# Patient Record
Sex: FEMALE | Race: WHITE | NOT HISPANIC OR LATINO | Employment: OTHER | URBAN - NONMETROPOLITAN AREA
[De-identification: names, ages, dates, MRNs, and addresses within clinical notes are randomized per-mention and may not be internally consistent; named-entity substitution may affect disease eponyms.]

---

## 2023-04-06 PROBLEM — L57.0 ACTINIC KERATOSES: Status: ACTIVE | Noted: 2023-04-06

## 2023-04-06 PROBLEM — L98.9 SKIN LESION: Status: ACTIVE | Noted: 2023-04-06

## 2023-04-06 PROBLEM — M79.675 PAIN OF TOE OF LEFT FOOT: Status: ACTIVE | Noted: 2023-04-06

## 2023-04-06 PROBLEM — R73.9 ELEVATED BLOOD SUGAR: Status: ACTIVE | Noted: 2023-04-06

## 2023-04-06 PROBLEM — M79.603 ARM PAIN: Status: ACTIVE | Noted: 2023-04-06

## 2023-04-06 PROBLEM — I10 BENIGN HYPERTENSION: Status: ACTIVE | Noted: 2023-04-06

## 2023-04-06 PROBLEM — L91.8 SKIN TAG: Status: ACTIVE | Noted: 2023-04-06

## 2023-04-06 PROBLEM — E03.9 ADULT HYPOTHYROIDISM: Status: ACTIVE | Noted: 2023-04-06

## 2023-04-06 PROBLEM — S69.92XA WRIST INJURY, LEFT, INITIAL ENCOUNTER: Status: ACTIVE | Noted: 2023-04-06

## 2023-04-06 PROBLEM — F41.9 ANXIETY: Status: ACTIVE | Noted: 2023-04-06

## 2023-04-06 PROBLEM — R71.8 ELEVATED HEMATOCRIT: Status: ACTIVE | Noted: 2023-04-06

## 2023-04-06 PROBLEM — N18.30 CHRONIC KIDNEY DISEASE, STAGE 3 (MULTI): Status: ACTIVE | Noted: 2023-04-06

## 2023-04-06 PROBLEM — F51.01 PRIMARY INSOMNIA: Status: ACTIVE | Noted: 2023-04-06

## 2023-04-06 PROBLEM — M79.642 PAIN OF LEFT HAND: Status: ACTIVE | Noted: 2023-04-06

## 2023-04-06 PROBLEM — R29.898 LEFT HAND WEAKNESS: Status: ACTIVE | Noted: 2023-04-06

## 2023-04-06 PROBLEM — K21.9 GERD (GASTROESOPHAGEAL REFLUX DISEASE): Status: ACTIVE | Noted: 2023-04-06

## 2023-04-06 PROBLEM — M25.642 STIFFNESS OF FINGER JOINT OF LEFT HAND: Status: ACTIVE | Noted: 2023-04-06

## 2023-04-06 PROBLEM — S62.102A CLOSED FRACTURE OF LEFT WRIST: Status: ACTIVE | Noted: 2023-04-06

## 2023-04-06 PROBLEM — I49.9 CARDIAC ARRHYTHMIA: Status: ACTIVE | Noted: 2023-04-06

## 2023-04-06 PROBLEM — S62.102D: Status: ACTIVE | Noted: 2023-04-06

## 2023-04-06 RX ORDER — AMLODIPINE BESYLATE 10 MG/1
1 TABLET ORAL DAILY
COMMUNITY
Start: 2021-02-17 | End: 2023-05-27 | Stop reason: SDUPTHER

## 2023-04-06 RX ORDER — LOSARTAN POTASSIUM 50 MG/1
1 TABLET ORAL DAILY
COMMUNITY
Start: 2021-02-17 | End: 2023-04-07 | Stop reason: SDUPTHER

## 2023-04-06 RX ORDER — MIRTAZAPINE 7.5 MG/1
1 TABLET, FILM COATED ORAL NIGHTLY
COMMUNITY
Start: 2020-09-11

## 2023-04-06 RX ORDER — HYDROXYZINE HYDROCHLORIDE 10 MG/1
1 TABLET, FILM COATED ORAL 3 TIMES DAILY PRN
COMMUNITY
Start: 2022-04-06 | End: 2024-01-08 | Stop reason: SDUPTHER

## 2023-04-06 RX ORDER — LEVOTHYROXINE SODIUM 100 UG/1
1 TABLET ORAL DAILY
COMMUNITY
Start: 2016-03-31 | End: 2023-09-01 | Stop reason: SDUPTHER

## 2023-04-06 RX ORDER — OMEPRAZOLE 20 MG/1
1 CAPSULE, DELAYED RELEASE ORAL
COMMUNITY
Start: 2016-04-30 | End: 2023-09-05 | Stop reason: SDUPTHER

## 2023-04-06 RX ORDER — PLECANATIDE 3 MG/1
1 TABLET ORAL DAILY
COMMUNITY
Start: 2022-10-13 | End: 2024-03-20 | Stop reason: WASHOUT

## 2023-04-07 ENCOUNTER — TELEPHONE (OUTPATIENT)
Dept: PRIMARY CARE | Facility: CLINIC | Age: 83
End: 2023-04-07
Payer: MEDICARE

## 2023-04-07 DIAGNOSIS — I10 BENIGN HYPERTENSION: Primary | ICD-10-CM

## 2023-04-07 RX ORDER — LOSARTAN POTASSIUM 50 MG/1
50 TABLET ORAL DAILY
Qty: 90 TABLET | Refills: 3 | Status: SHIPPED | OUTPATIENT
Start: 2023-04-07 | End: 2023-04-07 | Stop reason: SDUPTHER

## 2023-04-07 RX ORDER — LOSARTAN POTASSIUM 50 MG/1
50 TABLET ORAL DAILY
Qty: 90 TABLET | Refills: 3 | Status: SHIPPED | OUTPATIENT
Start: 2023-04-07 | End: 2024-01-08 | Stop reason: WASHOUT

## 2023-04-07 NOTE — TELEPHONE ENCOUNTER
Pt said that the pharmacy has been trying to contact you about her losartan since 3/20. They told pt that they have faxed and called you since. She only has 6 pills left. She has apt 4/19. She is using [facility] in [place].

## 2023-04-19 ENCOUNTER — OFFICE VISIT (OUTPATIENT)
Dept: PRIMARY CARE | Facility: CLINIC | Age: 83
End: 2023-04-19
Payer: MEDICARE

## 2023-04-19 VITALS
WEIGHT: 185.5 LBS | SYSTOLIC BLOOD PRESSURE: 110 MMHG | DIASTOLIC BLOOD PRESSURE: 78 MMHG | OXYGEN SATURATION: 95 % | HEART RATE: 136 BPM

## 2023-04-19 DIAGNOSIS — F41.9 ANXIETY: ICD-10-CM

## 2023-04-19 DIAGNOSIS — Z13.220 LIPID SCREENING: ICD-10-CM

## 2023-04-19 DIAGNOSIS — Z13.0 SCREENING FOR DEFICIENCY ANEMIA: ICD-10-CM

## 2023-04-19 DIAGNOSIS — I10 BENIGN HYPERTENSION: ICD-10-CM

## 2023-04-19 DIAGNOSIS — Z00.00 MEDICARE ANNUAL WELLNESS VISIT, SUBSEQUENT: Primary | ICD-10-CM

## 2023-04-19 DIAGNOSIS — Z13.1 DIABETES MELLITUS SCREENING: ICD-10-CM

## 2023-04-19 DIAGNOSIS — E03.9 ADULT HYPOTHYROIDISM: ICD-10-CM

## 2023-04-19 LAB
ALANINE AMINOTRANSFERASE (SGPT) (U/L) IN SER/PLAS: 10 U/L (ref 7–45)
ALBUMIN (G/DL) IN SER/PLAS: 3.8 G/DL (ref 3.4–5)
ALKALINE PHOSPHATASE (U/L) IN SER/PLAS: 95 U/L (ref 33–136)
ANION GAP IN SER/PLAS: 15 MMOL/L (ref 10–20)
ASPARTATE AMINOTRANSFERASE (SGOT) (U/L) IN SER/PLAS: 14 U/L (ref 9–39)
BILIRUBIN TOTAL (MG/DL) IN SER/PLAS: 0.3 MG/DL (ref 0–1.2)
CALCIUM (MG/DL) IN SER/PLAS: 9 MG/DL (ref 8.6–10.3)
CARBON DIOXIDE, TOTAL (MMOL/L) IN SER/PLAS: 27 MMOL/L (ref 21–32)
CHLORIDE (MMOL/L) IN SER/PLAS: 103 MMOL/L (ref 98–107)
CHOLESTEROL (MG/DL) IN SER/PLAS: 242 MG/DL (ref 0–199)
CHOLESTEROL IN HDL (MG/DL) IN SER/PLAS: 57.3 MG/DL
CHOLESTEROL/HDL RATIO: 4.2
CREATININE (MG/DL) IN SER/PLAS: 1.06 MG/DL (ref 0.5–1.05)
ERYTHROCYTE DISTRIBUTION WIDTH (RATIO) BY AUTOMATED COUNT: 15.8 % (ref 11.5–14.5)
ERYTHROCYTE MEAN CORPUSCULAR HEMOGLOBIN CONCENTRATION (G/DL) BY AUTOMATED: 30.9 G/DL (ref 32–36)
ERYTHROCYTE MEAN CORPUSCULAR VOLUME (FL) BY AUTOMATED COUNT: 88 FL (ref 80–100)
ERYTHROCYTES (10*6/UL) IN BLOOD BY AUTOMATED COUNT: 5.54 X10E12/L (ref 4–5.2)
GFR FEMALE: 53 ML/MIN/1.73M2
GLUCOSE (MG/DL) IN SER/PLAS: 94 MG/DL (ref 74–99)
HEMATOCRIT (%) IN BLOOD BY AUTOMATED COUNT: 48.8 % (ref 36–46)
HEMOGLOBIN (G/DL) IN BLOOD: 15.1 G/DL (ref 12–16)
LDL: 162 MG/DL (ref 0–99)
LEUKOCYTES (10*3/UL) IN BLOOD BY AUTOMATED COUNT: 7.9 X10E9/L (ref 4.4–11.3)
PLATELETS (10*3/UL) IN BLOOD AUTOMATED COUNT: 336 X10E9/L (ref 150–450)
POTASSIUM (MMOL/L) IN SER/PLAS: 4.9 MMOL/L (ref 3.5–5.3)
PROTEIN TOTAL: 6.4 G/DL (ref 6.4–8.2)
SODIUM (MMOL/L) IN SER/PLAS: 140 MMOL/L (ref 136–145)
THYROTROPIN (MIU/L) IN SER/PLAS BY DETECTION LIMIT <= 0.05 MIU/L: 0.76 MIU/L (ref 0.44–3.98)
TRIGLYCERIDE (MG/DL) IN SER/PLAS: 113 MG/DL (ref 0–149)
UREA NITROGEN (MG/DL) IN SER/PLAS: 13 MG/DL (ref 6–23)
VLDL: 23 MG/DL (ref 0–40)

## 2023-04-19 PROCEDURE — 3074F SYST BP LT 130 MM HG: CPT | Performed by: FAMILY MEDICINE

## 2023-04-19 PROCEDURE — 80061 LIPID PANEL: CPT

## 2023-04-19 PROCEDURE — 85027 COMPLETE CBC AUTOMATED: CPT

## 2023-04-19 PROCEDURE — G0439 PPPS, SUBSEQ VISIT: HCPCS | Performed by: FAMILY MEDICINE

## 2023-04-19 PROCEDURE — 84443 ASSAY THYROID STIM HORMONE: CPT

## 2023-04-19 PROCEDURE — 1159F MED LIST DOCD IN RCRD: CPT | Performed by: FAMILY MEDICINE

## 2023-04-19 PROCEDURE — 80053 COMPREHEN METABOLIC PANEL: CPT

## 2023-04-19 PROCEDURE — 3078F DIAST BP <80 MM HG: CPT | Performed by: FAMILY MEDICINE

## 2023-04-19 PROCEDURE — 1170F FXNL STATUS ASSESSED: CPT | Performed by: FAMILY MEDICINE

## 2023-04-19 ASSESSMENT — ENCOUNTER SYMPTOMS
JOINT SWELLING: 0
SEIZURES: 0
TROUBLE SWALLOWING: 0
DIARRHEA: 0
APPETITE CHANGE: 0
HEMATURIA: 0
FEVER: 0
PALPITATIONS: 0
BLOOD IN STOOL: 0
CONSTIPATION: 0
DIFFICULTY URINATING: 0
CONFUSION: 0
COLOR CHANGE: 0
SHORTNESS OF BREATH: 0
UNEXPECTED WEIGHT CHANGE: 0
NERVOUS/ANXIOUS: 0

## 2023-04-19 ASSESSMENT — ACTIVITIES OF DAILY LIVING (ADL)
GROCERY_SHOPPING: INDEPENDENT
DRESSING: INDEPENDENT
DOING_HOUSEWORK: INDEPENDENT
MANAGING_FINANCES: INDEPENDENT
TAKING_MEDICATION: INDEPENDENT
BATHING: INDEPENDENT

## 2023-04-19 ASSESSMENT — PATIENT HEALTH QUESTIONNAIRE - PHQ9
2. FEELING DOWN, DEPRESSED OR HOPELESS: NOT AT ALL
SUM OF ALL RESPONSES TO PHQ9 QUESTIONS 1 AND 2: 0
1. LITTLE INTEREST OR PLEASURE IN DOING THINGS: NOT AT ALL

## 2023-04-19 NOTE — PATIENT INSTRUCTIONS
[facility] in [place]   Address: [address] [place] [id number]  Phone: [phone]    [facility] [place]  Address: [address] , [place] [address]  Phone: [phone]

## 2023-04-19 NOTE — PROGRESS NOTES
Subjective   Reason for Visit: Alyssa Jeffers is an 82 y.o. female here for a Medicare Wellness visit.     Past Medical, Surgical, and Family History reviewed and updated in chart.    Reviewed all medications by prescribing practitioner or clinical pharmacist (such as prescriptions, OTCs, herbal therapies and supplements) and documented in the medical record.    Left wrist fx:  Healing but still having some pain with certain motion    Htn:  Controlled    Left knee pain:  Does well with riding bike    +hearing loss  W/ tinnitus          Patient Care Team:  Nico PITT DO as PCP - General  Nico PITT DO as PCP - [employer] PCP     Review of Systems   Constitutional:  Negative for appetite change, fever and unexpected weight change.   HENT:  Negative for congestion and trouble swallowing.    Eyes:  Negative for visual disturbance.   Respiratory:  Negative for shortness of breath.    Cardiovascular:  Negative for chest pain, palpitations and leg swelling.   Gastrointestinal:  Negative for blood in stool, constipation and diarrhea.   Genitourinary:  Negative for difficulty urinating and hematuria.   Musculoskeletal:  Negative for gait problem and joint swelling.   Skin:  Negative for color change.   Allergic/Immunologic: Negative for immunocompromised state.   Neurological:  Negative for seizures and syncope.   Psychiatric/Behavioral:  Negative for confusion and suicidal ideas. The patient is not nervous/anxious.        Objective   Vitals:  /78   Pulse (!) 136   Wt 84.1 kg (185 lb 8 oz)   SpO2 95%       Physical Exam  Constitutional:       General: She is not in acute distress.     Appearance: Normal appearance. She is not ill-appearing.   HENT:      Head: Normocephalic and atraumatic.      Right Ear: Tympanic membrane normal.      Left Ear: Tympanic membrane normal.      Nose: Nose normal.      Mouth/Throat:      Mouth: Mucous membranes are moist.   Eyes:      Pupils: Pupils are  equal, round, and reactive to light.   Cardiovascular:      Rate and Rhythm: Regular rhythm. Tachycardia present.      Heart sounds: No murmur heard.     No friction rub. No gallop.   Pulmonary:      Effort: Pulmonary effort is normal.      Breath sounds: Normal breath sounds.   Abdominal:      General: Abdomen is flat. There is no distension.      Palpations: Abdomen is soft.      Tenderness: There is no abdominal tenderness. There is no guarding.      Hernia: No hernia is present.   Musculoskeletal:         General: Normal range of motion.   Skin:     General: Skin is warm and dry.   Neurological:      General: No focal deficit present.      Mental Status: She is alert. Mental status is at baseline.      Cranial Nerves: No cranial nerve deficit.      Motor: No weakness.      Gait: Gait normal.   Psychiatric:         Mood and Affect: Mood normal.         Behavior: Behavior normal.         Thought Content: Thought content normal.         Judgment: Judgment normal.         Assessment/Plan   Problem List Items Addressed This Visit          Circulatory    Benign hypertension    Relevant Orders    CBC    Comprehensive Metabolic Panel    Lipid Panel    TSH with reflex to Free T4 if abnormal       Endocrine/Metabolic    Adult hypothyroidism    Relevant Orders    TSH with reflex to Free T4 if abnormal       Other    Anxiety     Other Visit Diagnoses       Medicare annual wellness visit, subsequent    -  Primary    Screening for deficiency anemia        Relevant Orders    CBC    Diabetes mellitus screening        Relevant Orders    Comprehensive Metabolic Panel    Lipid screening        Relevant Orders    Lipid Panel        Assessment:  -Hypothyroidism  Levo 100mg  Check tsh    -Hypertension  Losartan 50mg , norvasc 10mg     -Insomnia:  Remeron- very sensitive to it    -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    -GERD    -CKD stage III    -left knee pain:   s/p meniscus tear   We discussed further workup    -Elev hct: secondary    followed by oncology- referral only if needed  Phlebotomy prn       -Right arm/shoulder pain: more bicep pain vs RTC, neg xray-US suggested that it RTC    -tachy: 2/2 anxiety- nml tsh, cbc  EKG: sinus     -Left arm distal radius fx: followed by ortho     -hearing loss w/ tinnitus:  Suggest ent/audiology       [facility] info given      Health maintenance:  -Medicare: 2024  -Preventative: next   -Patient's up-to-date on Pneumovax/prevnar, shingrix  -Patient refuses cholesterol medicine, mammogram,dexa  -refused colon ca screen  -lab today

## 2023-05-27 DIAGNOSIS — I10 BENIGN HYPERTENSION: Primary | ICD-10-CM

## 2023-05-30 RX ORDER — AMLODIPINE BESYLATE 10 MG/1
10 TABLET ORAL DAILY
Qty: 90 TABLET | Refills: 3 | Status: SHIPPED | OUTPATIENT
Start: 2023-05-30 | End: 2023-06-01 | Stop reason: SDUPTHER

## 2023-06-01 DIAGNOSIS — I10 BENIGN HYPERTENSION: ICD-10-CM

## 2023-06-01 RX ORDER — AMLODIPINE BESYLATE 10 MG/1
10 TABLET ORAL DAILY
Qty: 90 TABLET | Refills: 3 | Status: SHIPPED | OUTPATIENT
Start: 2023-06-01 | End: 2024-05-17

## 2023-09-01 DIAGNOSIS — E03.9 ADULT HYPOTHYROIDISM: Primary | ICD-10-CM

## 2023-09-02 DIAGNOSIS — E03.9 ADULT HYPOTHYROIDISM: ICD-10-CM

## 2023-09-02 RX ORDER — LEVOTHYROXINE SODIUM 100 UG/1
100 TABLET ORAL DAILY
Qty: 90 TABLET | Refills: 0 | Status: SHIPPED | OUTPATIENT
Start: 2023-09-02 | End: 2023-11-26

## 2023-09-02 RX ORDER — LEVOTHYROXINE SODIUM 100 UG/1
100 TABLET ORAL DAILY
Qty: 90 TABLET | Refills: 0 | Status: SHIPPED | OUTPATIENT
Start: 2023-09-02 | End: 2023-09-02 | Stop reason: SDUPTHER

## 2023-09-05 DIAGNOSIS — K21.9 GASTROESOPHAGEAL REFLUX DISEASE WITHOUT ESOPHAGITIS: Primary | ICD-10-CM

## 2023-09-05 RX ORDER — OMEPRAZOLE 20 MG/1
20 CAPSULE, DELAYED RELEASE ORAL
Qty: 90 CAPSULE | Refills: 1 | Status: SHIPPED | OUTPATIENT
Start: 2023-09-05 | End: 2024-02-24

## 2023-09-20 ENCOUNTER — OFFICE VISIT (OUTPATIENT)
Dept: PRIMARY CARE | Facility: CLINIC | Age: 83
End: 2023-09-20
Payer: MEDICARE

## 2023-09-20 VITALS
HEIGHT: 66 IN | BODY MASS INDEX: 29.73 KG/M2 | HEART RATE: 103 BPM | WEIGHT: 185 LBS | DIASTOLIC BLOOD PRESSURE: 76 MMHG | OXYGEN SATURATION: 92 % | SYSTOLIC BLOOD PRESSURE: 142 MMHG

## 2023-09-20 DIAGNOSIS — Z23 FLU VACCINE NEED: ICD-10-CM

## 2023-09-20 DIAGNOSIS — E03.9 ADULT HYPOTHYROIDISM: ICD-10-CM

## 2023-09-20 DIAGNOSIS — N18.31 STAGE 3A CHRONIC KIDNEY DISEASE (MULTI): ICD-10-CM

## 2023-09-20 DIAGNOSIS — R06.02 SHORT OF BREATH ON EXERTION: ICD-10-CM

## 2023-09-20 DIAGNOSIS — Z00.00 ROUTINE GENERAL MEDICAL EXAMINATION AT A HEALTH CARE FACILITY: Primary | ICD-10-CM

## 2023-09-20 PROCEDURE — 90662 IIV NO PRSV INCREASED AG IM: CPT | Performed by: FAMILY MEDICINE

## 2023-09-20 PROCEDURE — 3077F SYST BP >= 140 MM HG: CPT | Performed by: FAMILY MEDICINE

## 2023-09-20 PROCEDURE — 3078F DIAST BP <80 MM HG: CPT | Performed by: FAMILY MEDICINE

## 2023-09-20 PROCEDURE — 99397 PER PM REEVAL EST PAT 65+ YR: CPT | Performed by: FAMILY MEDICINE

## 2023-09-20 PROCEDURE — G0008 ADMIN INFLUENZA VIRUS VAC: HCPCS | Performed by: FAMILY MEDICINE

## 2023-09-20 PROCEDURE — 1159F MED LIST DOCD IN RCRD: CPT | Performed by: FAMILY MEDICINE

## 2023-09-20 PROCEDURE — 99213 OFFICE O/P EST LOW 20 MIN: CPT | Performed by: FAMILY MEDICINE

## 2023-09-20 ASSESSMENT — PATIENT HEALTH QUESTIONNAIRE - PHQ9
1. LITTLE INTEREST OR PLEASURE IN DOING THINGS: NOT AT ALL
1. LITTLE INTEREST OR PLEASURE IN DOING THINGS: NOT AT ALL
2. FEELING DOWN, DEPRESSED OR HOPELESS: NOT AT ALL
SUM OF ALL RESPONSES TO PHQ9 QUESTIONS 1 AND 2: 0
SUM OF ALL RESPONSES TO PHQ9 QUESTIONS 1 AND 2: 0
2. FEELING DOWN, DEPRESSED OR HOPELESS: NOT AT ALL

## 2023-09-20 ASSESSMENT — ENCOUNTER SYMPTOMS
DIFFICULTY URINATING: 0
DIARRHEA: 0
UNEXPECTED WEIGHT CHANGE: 0
HEMATURIA: 0
APPETITE CHANGE: 0
SEIZURES: 0
NERVOUS/ANXIOUS: 0
FEVER: 0
COLOR CHANGE: 0
BLOOD IN STOOL: 0
CONSTIPATION: 0
CONFUSION: 0
SHORTNESS OF BREATH: 0
PALPITATIONS: 0
TROUBLE SWALLOWING: 0
JOINT SWELLING: 0

## 2023-09-20 NOTE — PROGRESS NOTES
"Subjective   Reason for Visit: Alyssa Jeffers is an 82 y.o. female here for a Medicare Wellness visit.          Reviewed all medications by prescribing practitioner or clinical pharmacist (such as prescriptions, OTCs, herbal therapies and supplements) and documented in the medical record.    HPI  Hx of left wrist fx:  Some weakness but doing well    Htn:  -nml when donating blood  -states since started norvasc some sob- can walk through GE but sometimes in shower will get sob.   -doesn't want any testing  -denies any cp,palps     Patient Care Team:  Nico PITT DO as PCP - General  Nico PITT DO as PCP - [employer]vantage PCP  Nico PITT DO as PCP - [employer] PCP     Review of Systems   Constitutional:  Negative for appetite change, fever and unexpected weight change.   HENT:  Negative for congestion and trouble swallowing.    Eyes:  Negative for visual disturbance.   Respiratory:  Negative for shortness of breath.    Cardiovascular:  Negative for chest pain, palpitations and leg swelling.   Gastrointestinal:  Negative for blood in stool, constipation and diarrhea.   Genitourinary:  Negative for difficulty urinating and hematuria.   Musculoskeletal:  Negative for gait problem and joint swelling.   Skin:  Negative for color change.   Allergic/Immunologic: Negative for immunocompromised state.   Neurological:  Negative for seizures and syncope.   Psychiatric/Behavioral:  Negative for confusion and suicidal ideas. The patient is not nervous/anxious.        Objective   Vitals:  /76   Pulse 103   Ht 1.664 m (5' 5.5\")   Wt 83.9 kg (185 lb)   SpO2 92%   BMI 30.32 kg/m²       Physical Exam  Constitutional:       General: She is not in acute distress.     Appearance: Normal appearance. She is not ill-appearing.   HENT:      Head: Normocephalic and atraumatic.      Right Ear: Tympanic membrane normal.      Left Ear: Tympanic membrane normal.      Nose: Nose normal. "      Mouth/Throat:      Mouth: Mucous membranes are moist.   Eyes:      Pupils: Pupils are equal, round, and reactive to light.   Cardiovascular:      Rate and Rhythm: Normal rate and regular rhythm.      Heart sounds: No murmur heard.     No friction rub. No gallop.   Pulmonary:      Effort: Pulmonary effort is normal.      Breath sounds: Normal breath sounds.   Abdominal:      General: Abdomen is flat. There is no distension.      Palpations: Abdomen is soft.      Tenderness: There is no abdominal tenderness. There is no guarding.      Hernia: No hernia is present.   Musculoskeletal:         General: Normal range of motion.   Skin:     General: Skin is warm and dry.   Neurological:      General: No focal deficit present.      Mental Status: She is alert. Mental status is at baseline.      Cranial Nerves: No cranial nerve deficit.      Motor: No weakness.      Gait: Gait normal.   Psychiatric:         Mood and Affect: Mood normal.         Behavior: Behavior normal.         Thought Content: Thought content normal.         Judgment: Judgment normal.         Assessment/Plan   Problem List Items Addressed This Visit       Adult hypothyroidism    Chronic kidney disease, stage 3 (CMS/HCC)     Other Visit Diagnoses       Routine general medical examination at a health care facility    -  Primary    Short of breath on exertion        Flu vaccine need        Relevant Orders    Flu vaccine, quadrivalent, high-dose, preservative free, age 65y+ (FLUZONE)        Assessment:  -Hypothyroidism  Levo 100mg  Check tsh    -Hypertension  Losartan 50mg , norvasc 10mg     -sob w/ exertion: 2/2 cardiac vs resp vs meds  D/w pt that likely to occur w/ norvasc  Refused and testing     -Insomnia:  Remeron- very sensitive to it    -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    -GERD    -CKD stage III    -left knee pain:   s/p meniscus tear   We discussed further workup    -Elev hct: secondary   followed by oncology- referral only if needed  Phlebotomy prn        -Right arm/shoulder pain: more bicep pain vs RTC, neg xray-US suggested that it RTC    -tachy: 2/2 anxiety- nml tsh, cbc  EKG: sinus     -Left arm distal radius fx: followed by ortho     -hearing loss w/ tinnitus:  Suggest ent/audiology       [facility] info given      Health maintenance:  -Medicare: 2024  -Preventative: 2024   -Patient's up-to-date on Pneumovax/prevnar, shingrix  -Patient refuses cholesterol medicine, mammogram,dexa  -refused colon ca screen  -lab today

## 2023-11-25 DIAGNOSIS — E03.9 ADULT HYPOTHYROIDISM: ICD-10-CM

## 2023-11-26 RX ORDER — LEVOTHYROXINE SODIUM 100 UG/1
TABLET ORAL
Qty: 90 TABLET | Refills: 0 | Status: SHIPPED | OUTPATIENT
Start: 2023-11-26 | End: 2024-02-24

## 2023-12-06 ENCOUNTER — TELEPHONE (OUTPATIENT)
Dept: PRIMARY CARE | Facility: CLINIC | Age: 83
End: 2023-12-06
Payer: MEDICARE

## 2023-12-06 NOTE — TELEPHONE ENCOUNTER
Ladan donated blood on Nov 8.  She just is not bouncing back.  She is very tired, gets sob and has to sit down.  Can she have a phone appt or would you want her in house?  [phone]

## 2023-12-11 ENCOUNTER — OFFICE VISIT (OUTPATIENT)
Dept: PRIMARY CARE | Facility: CLINIC | Age: 83
End: 2023-12-11
Payer: MEDICARE

## 2023-12-11 VITALS
DIASTOLIC BLOOD PRESSURE: 69 MMHG | SYSTOLIC BLOOD PRESSURE: 115 MMHG | HEART RATE: 55 BPM | BODY MASS INDEX: 30.28 KG/M2 | WEIGHT: 184.8 LBS | OXYGEN SATURATION: 92 %

## 2023-12-11 DIAGNOSIS — D50.0 IRON DEFICIENCY ANEMIA DUE TO CHRONIC BLOOD LOSS: ICD-10-CM

## 2023-12-11 DIAGNOSIS — R71.8 ELEVATED HEMATOCRIT: ICD-10-CM

## 2023-12-11 DIAGNOSIS — Z13.0 SCREENING FOR DEFICIENCY ANEMIA: Primary | ICD-10-CM

## 2023-12-11 LAB — POC HEMOGLOBIN: 10.8 G/DL (ref 12–16)

## 2023-12-11 PROCEDURE — 3078F DIAST BP <80 MM HG: CPT | Performed by: FAMILY MEDICINE

## 2023-12-11 PROCEDURE — 99213 OFFICE O/P EST LOW 20 MIN: CPT | Performed by: FAMILY MEDICINE

## 2023-12-11 PROCEDURE — 3074F SYST BP LT 130 MM HG: CPT | Performed by: FAMILY MEDICINE

## 2023-12-11 PROCEDURE — 1159F MED LIST DOCD IN RCRD: CPT | Performed by: FAMILY MEDICINE

## 2023-12-11 PROCEDURE — 85018 HEMOGLOBIN: CPT | Performed by: FAMILY MEDICINE

## 2023-12-11 PROCEDURE — 1036F TOBACCO NON-USER: CPT | Performed by: FAMILY MEDICINE

## 2023-12-11 RX ORDER — FERROUS SULFATE 325(65) MG
325 TABLET, DELAYED RELEASE (ENTERIC COATED) ORAL 2 TIMES DAILY
Qty: 60 TABLET | Refills: 11 | Status: SHIPPED | OUTPATIENT
Start: 2023-12-11 | End: 2024-01-08 | Stop reason: SDUPTHER

## 2023-12-11 RX ORDER — ASCORBIC ACID 250 MG
250 TABLET ORAL 2 TIMES DAILY
Qty: 60 TABLET | Refills: 11 | Status: SHIPPED | OUTPATIENT
Start: 2023-12-11 | End: 2024-12-10

## 2023-12-11 ASSESSMENT — ENCOUNTER SYMPTOMS
DIFFICULTY URINATING: 0
COLOR CHANGE: 0
JOINT SWELLING: 0
HEMATURIA: 0
SHORTNESS OF BREATH: 0
DIARRHEA: 0
CONSTIPATION: 0
NERVOUS/ANXIOUS: 0
PALPITATIONS: 0
SEIZURES: 0
FEVER: 0
CONFUSION: 0
BLOOD IN STOOL: 0
APPETITE CHANGE: 0
TROUBLE SWALLOWING: 0
UNEXPECTED WEIGHT CHANGE: 0

## 2023-12-11 NOTE — PROGRESS NOTES
Subjective   Reason for Visit: Alyssa Jeffers is an 83 y.o. female here for    Sob when walking since giving blood    Reviewed all medications by prescribing practitioner or clinical pharmacist (such as prescriptions, OTCs, herbal therapies and supplements) and documented in the medical record.    HPI  Presents today with a concern of shortness of breath with exertion.  Patient states that is been going on since she gave blood in October.  She gave blood multiple times in a row first time she gave half withdrawal secondary to clotting and then she gave 2 more full draws.  After the second time she been unable to recover from the shortness of breath with exertion.  Denies any chest pain no palpitations no syncope or near syncope.  States that she will be cutting food she will need to rest.  Walking into the store she will need to rest because she will become short of breath.  She denies any orthopnea or PND.  No swelling in her legs.  She had lab work that is been up-to-date as far as her thyroid goes.  No long travel she has not been sedentary for days.  She had no swelling or leg.  No tachycardia.  She been taking her other medicines as prescribed      Patient Care Team:  Nico PITT DO as PCP - General  Nico PITT DO as PCP - [employer] PCP  Nico PITT DO as PCP - [employer] PCP     Review of Systems   Constitutional:  Negative for appetite change, fever and unexpected weight change.   HENT:  Negative for congestion and trouble swallowing.    Eyes:  Negative for visual disturbance.   Respiratory:  Negative for shortness of breath.    Cardiovascular:  Negative for chest pain, palpitations and leg swelling.   Gastrointestinal:  Negative for blood in stool, constipation and diarrhea.   Genitourinary:  Negative for difficulty urinating and hematuria.   Musculoskeletal:  Negative for gait problem and joint swelling.   Skin:  Negative for color change.    Allergic/Immunologic: Negative for immunocompromised state.   Neurological:  Negative for seizures and syncope.   Psychiatric/Behavioral:  Negative for confusion and suicidal ideas. The patient is not nervous/anxious.        Objective   Vitals:  Wt 83.8 kg (184 lb 12.8 oz)   BMI 30.28 kg/m²       Physical Exam  Constitutional:       General: She is not in acute distress.     Appearance: Normal appearance. She is not ill-appearing.   HENT:      Head: Normocephalic and atraumatic.      Right Ear: Tympanic membrane normal.      Left Ear: Tympanic membrane normal.      Nose: Nose normal.      Mouth/Throat:      Mouth: Mucous membranes are moist.   Eyes:      Pupils: Pupils are equal, round, and reactive to light.   Cardiovascular:      Rate and Rhythm: Normal rate and regular rhythm.      Heart sounds: No murmur heard.     No friction rub. No gallop.   Pulmonary:      Effort: Pulmonary effort is normal.      Breath sounds: Normal breath sounds.   Abdominal:      General: Abdomen is flat. There is no distension.      Palpations: Abdomen is soft.      Tenderness: There is no abdominal tenderness. There is no guarding.      Hernia: No hernia is present.   Musculoskeletal:         General: Normal range of motion.   Skin:     General: Skin is warm and dry.   Neurological:      General: No focal deficit present.      Mental Status: She is alert. Mental status is at baseline.      Cranial Nerves: No cranial nerve deficit.      Motor: No weakness.      Gait: Gait normal.   Psychiatric:         Mood and Affect: Mood normal.         Behavior: Behavior normal.         Thought Content: Thought content normal.         Judgment: Judgment normal.       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    -GERD    -CKD stage III    -left knee pain:   s/p meniscus tear   We discussed further workup    -Elev hct: secondary   followed by oncology- referral only if needed  Phlebotomy prn       -Right arm/shoulder pain: more bicep pain vs RTC, neg xray-US suggested that it RTC    -tachy: 2/2 anxiety- nml tsh, cbc  EKG: sinus     -Left arm distal radius fx: followed by ortho     -hearing loss w/ tinnitus:  Suggest ent/audiology       [facility] info given      -SOB w/ exertion: suspect 2/2 anemia (from blood draw) vs thyroid (last tsh controlled/wt stable/hr nml) vs cardiac (no cp, wt stable,+anemia) vs PE (nml hr, no risk factors)  Hb: 10.8  Start feso4 bid w/ vit c  Cut losartan to 1/2 tab   Follow up 1mth or sooner if not better we will start cardiac workup     Health maintenance:  -Medicare: 2024  -Preventative: 2024   -Patient's up-to-date on Pneumovax/prevnar, shingrix  -Patient refuses cholesterol medicine, mammogram,dexa  -refused colon ca screen  -lab today

## 2023-12-11 NOTE — PATIENT INSTRUCTIONS
Cut losartan in 1/2 until seen     Follow up 1mth but call if no improvement or worsening over next couple weeks for cardiac workup

## 2024-01-08 ENCOUNTER — OFFICE VISIT (OUTPATIENT)
Dept: PRIMARY CARE | Facility: CLINIC | Age: 84
End: 2024-01-08
Payer: MEDICARE

## 2024-01-08 VITALS
WEIGHT: 186.4 LBS | BODY MASS INDEX: 30.55 KG/M2 | HEART RATE: 74 BPM | SYSTOLIC BLOOD PRESSURE: 112 MMHG | OXYGEN SATURATION: 96 % | DIASTOLIC BLOOD PRESSURE: 72 MMHG

## 2024-01-08 DIAGNOSIS — R71.8 ELEVATED HEMATOCRIT: ICD-10-CM

## 2024-01-08 DIAGNOSIS — N18.31 STAGE 3A CHRONIC KIDNEY DISEASE (MULTI): ICD-10-CM

## 2024-01-08 DIAGNOSIS — D50.0 IRON DEFICIENCY ANEMIA DUE TO CHRONIC BLOOD LOSS: ICD-10-CM

## 2024-01-08 DIAGNOSIS — Z00.00 MEDICARE ANNUAL WELLNESS VISIT, SUBSEQUENT: Primary | ICD-10-CM

## 2024-01-08 DIAGNOSIS — R73.9 ELEVATED BLOOD SUGAR: ICD-10-CM

## 2024-01-08 DIAGNOSIS — Z13.0 SCREENING FOR DEFICIENCY ANEMIA: ICD-10-CM

## 2024-01-08 LAB — POC HEMOGLOBIN: 12.6 G/DL (ref 12–16)

## 2024-01-08 PROCEDURE — 1170F FXNL STATUS ASSESSED: CPT | Performed by: FAMILY MEDICINE

## 2024-01-08 PROCEDURE — 1159F MED LIST DOCD IN RCRD: CPT | Performed by: FAMILY MEDICINE

## 2024-01-08 PROCEDURE — 3078F DIAST BP <80 MM HG: CPT | Performed by: FAMILY MEDICINE

## 2024-01-08 PROCEDURE — 1036F TOBACCO NON-USER: CPT | Performed by: FAMILY MEDICINE

## 2024-01-08 PROCEDURE — 99397 PER PM REEVAL EST PAT 65+ YR: CPT | Performed by: FAMILY MEDICINE

## 2024-01-08 PROCEDURE — 85018 HEMOGLOBIN: CPT | Performed by: FAMILY MEDICINE

## 2024-01-08 PROCEDURE — 3074F SYST BP LT 130 MM HG: CPT | Performed by: FAMILY MEDICINE

## 2024-01-08 PROCEDURE — G0439 PPPS, SUBSEQ VISIT: HCPCS | Performed by: FAMILY MEDICINE

## 2024-01-08 RX ORDER — FERROUS SULFATE 325(65) MG
325 TABLET, DELAYED RELEASE (ENTERIC COATED) ORAL
Qty: 90 TABLET | Refills: 0 | Status: SHIPPED | OUTPATIENT
Start: 2024-01-08 | End: 2024-04-07

## 2024-01-08 ASSESSMENT — ACTIVITIES OF DAILY LIVING (ADL)
TAKING_MEDICATION: NEEDS ASSISTANCE
DRESSING: INDEPENDENT
MANAGING_FINANCES: INDEPENDENT
GROCERY_SHOPPING: NEEDS ASSISTANCE
BATHING: INDEPENDENT
DOING_HOUSEWORK: NEEDS ASSISTANCE

## 2024-01-08 ASSESSMENT — ENCOUNTER SYMPTOMS
UNEXPECTED WEIGHT CHANGE: 0
DIFFICULTY URINATING: 0
COLOR CHANGE: 0
PALPITATIONS: 0
NERVOUS/ANXIOUS: 0
TROUBLE SWALLOWING: 0
DIARRHEA: 0
CONSTIPATION: 0
FEVER: 0
SEIZURES: 0
SHORTNESS OF BREATH: 0
CONFUSION: 0
APPETITE CHANGE: 0
HEMATURIA: 0
BLOOD IN STOOL: 0
JOINT SWELLING: 0

## 2024-01-08 NOTE — PROGRESS NOTES
Subjective   Reason for Visit: Alyssa Jeffers is an 83 y.o. female here for a Medicare Wellness visit.          Reviewed all medications by prescribing practitioner or clinical pharmacist (such as prescriptions, OTCs, herbal therapies and supplements) and documented in the medical record.    HPI    Pleasant 83-year-old  female presents today for a follow-up and a checkup.  Overall patient states that she has been doing much better.  She is a history of shortness of breath with exertion.  At her last visit she was slightly anemic it was after giving blood multiple times in a row.  We placed her on iron twice daily.  She has been tolerating it well but does not like the fact that she has to watch her diet timing.  States that she does feel a lot better.  We also decreased her losartan from 50 mg to 25 mg.  Her blood pressure is again on the normal side.  Worse is when she is taking a shower but states that the previous orthostatic symptoms have improved.  No chest pain.  Sometimes she will feel her heart racing when she gets exerted but denies any palpitations.  No syncope or near syncope.  Hypertension as above patient's blood pressure is still normal on losartan 25 mg.  Needs blood work in April  Hyperlipidemia: Refuses statin  Insomnia: Uses just a small amount of Remeron to help her sleep a couple times a week  Patient Care Team:  Nico Salas V DO as PCP - General  Nico Salas V DO as PCP - [employer] PCP  Nico PITT DO as PCP - [employer] PCP     Review of Systems   Constitutional:  Negative for appetite change, fever and unexpected weight change.   HENT:  Negative for congestion and trouble swallowing.    Eyes:  Negative for visual disturbance.   Respiratory:  Negative for shortness of breath.    Cardiovascular:  Negative for chest pain, palpitations and leg swelling.   Gastrointestinal:  Negative for blood in stool, constipation and diarrhea.    Genitourinary:  Negative for difficulty urinating and hematuria.   Musculoskeletal:  Negative for gait problem and joint swelling.   Skin:  Negative for color change.   Allergic/Immunologic: Negative for immunocompromised state.   Neurological:  Negative for seizures and syncope.   Psychiatric/Behavioral:  Negative for confusion and suicidal ideas. The patient is not nervous/anxious.        Objective   Vitals:  /72   Pulse 74   Wt 84.6 kg (186 lb 6.4 oz)   SpO2 96%   BMI 30.55 kg/m²       Physical Exam  Constitutional:       General: She is not in acute distress.     Appearance: Normal appearance. She is not ill-appearing.   HENT:      Head: Normocephalic and atraumatic.      Right Ear: Tympanic membrane normal.      Left Ear: Tympanic membrane normal.      Nose: Nose normal.      Mouth/Throat:      Mouth: Mucous membranes are moist.   Eyes:      Pupils: Pupils are equal, round, and reactive to light.   Cardiovascular:      Rate and Rhythm: Normal rate and regular rhythm.      Heart sounds: No murmur heard.     No friction rub. No gallop.   Pulmonary:      Effort: Pulmonary effort is normal.      Breath sounds: Normal breath sounds.   Abdominal:      General: Abdomen is flat. There is no distension.      Palpations: Abdomen is soft.      Tenderness: There is no abdominal tenderness. There is no guarding.      Hernia: No hernia is present.   Musculoskeletal:         General: Normal range of motion.   Skin:     General: Skin is warm and dry.   Neurological:      General: No focal deficit present.      Mental Status: She is alert. Mental status is at baseline.      Cranial Nerves: No cranial nerve deficit.      Motor: No weakness.      Gait: Gait normal.   Psychiatric:         Mood and Affect: Mood normal.         Behavior: Behavior normal.         Thought Content: Thought content normal.         Judgment: Judgment normal.       Assessment/Plan   Problem List Items Addressed This Visit       Chronic kidney  disease, stage 3 (CMS/HCC)    Elevated blood sugar    Elevated hematocrit     Other Visit Diagnoses       Medicare annual wellness visit, subsequent    -  Primary    Screening for deficiency anemia        Relevant Orders    POCT hemoglobin manually resulted    Iron deficiency anemia due to chronic blood loss        Relevant Orders    POCT hemoglobin manually resulted             Assessment:  -Hypothyroidism  Levo 100mg     -Hypertension      norvasc 10mg    stop losartan since on 25mg and bp still nml   Follow-up in March    -Insomnia:  Remeron- very sensitive to it     -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  Not heard today    -GERD     -CKD stage III     -left knee pain:   s/p meniscus tear     -Elev hct: secondary   followed by oncology- referral only if needed  Phlebotomy prn        -Right arm/shoulder pain: more bicep pain vs RTC, neg xray-US suggested that it RTC     -tachy: 2/2 anxiety- nml tsh, cbc  EKG: sinus      -hearing loss w/ tinnitus:  Suggest ent/audiology       [facility] info given      -SOB w/ exertion: suspect 2/2 anemia (from blood draw) vs thyroid (last tsh controlled/wt stable/hr nml) vs cardiac (no cp, wt stable,+anemia) vs PE (nml hr, no risk factors)  Hb: 10.8  Improving feso4 bid w/ vit c decreased to daily  Stop losartan      Health maintenance:  -Medicare: 2025  -Preventative: 2024   -Patient's up-to-date on Pneumovax/prevnar, shingrix  -Patient refuses cholesterol medicine, mammogram,dexa  -refused colon ca screen  -lab next visit         Follow-up 3 months

## 2024-01-08 NOTE — PROGRESS NOTES
Subjective   Reason for Visit: Alyssa Jeffers is an 83 y.o. female here for a Medicare Wellness visit and 1 month follow up on exertional SOB.      HPI      Patient Care Team:  Nico PITT DO as PCP - General  Nico PITT DO as PCP - [employer] PCP  Nico PITT DO as PCP - [employer] PCP     Review of Systems    Objective   Vitals:  There were no vitals taken for this visit.      Physical Exam    Assessment/Plan   Problem List Items Addressed This Visit    None

## 2024-02-24 DIAGNOSIS — K21.9 GASTROESOPHAGEAL REFLUX DISEASE WITHOUT ESOPHAGITIS: ICD-10-CM

## 2024-02-24 DIAGNOSIS — E03.9 ADULT HYPOTHYROIDISM: ICD-10-CM

## 2024-02-24 RX ORDER — LEVOTHYROXINE SODIUM 100 UG/1
TABLET ORAL
Qty: 90 TABLET | Refills: 0 | Status: SHIPPED | OUTPATIENT
Start: 2024-02-24 | End: 2024-05-17

## 2024-02-24 RX ORDER — OMEPRAZOLE 20 MG/1
CAPSULE, DELAYED RELEASE ORAL
Qty: 90 CAPSULE | Refills: 1 | Status: SHIPPED | OUTPATIENT
Start: 2024-02-24

## 2024-03-20 ENCOUNTER — OFFICE VISIT (OUTPATIENT)
Dept: PRIMARY CARE | Facility: CLINIC | Age: 84
End: 2024-03-20
Payer: MEDICARE

## 2024-03-20 VITALS
HEART RATE: 75 BPM | OXYGEN SATURATION: 97 % | SYSTOLIC BLOOD PRESSURE: 150 MMHG | BODY MASS INDEX: 30.22 KG/M2 | DIASTOLIC BLOOD PRESSURE: 86 MMHG | HEIGHT: 66 IN | WEIGHT: 188 LBS

## 2024-03-20 DIAGNOSIS — I10 BENIGN HYPERTENSION: ICD-10-CM

## 2024-03-20 DIAGNOSIS — D50.0 IRON DEFICIENCY ANEMIA DUE TO CHRONIC BLOOD LOSS: ICD-10-CM

## 2024-03-20 DIAGNOSIS — Z00.00 ROUTINE GENERAL MEDICAL EXAMINATION AT A HEALTH CARE FACILITY: Primary | ICD-10-CM

## 2024-03-20 DIAGNOSIS — Z13.1 DIABETES MELLITUS SCREENING: ICD-10-CM

## 2024-03-20 DIAGNOSIS — F41.9 ANXIETY: ICD-10-CM

## 2024-03-20 DIAGNOSIS — E03.9 ADULT HYPOTHYROIDISM: ICD-10-CM

## 2024-03-20 DIAGNOSIS — Z13.220 LIPID SCREENING: ICD-10-CM

## 2024-03-20 DIAGNOSIS — N18.31 STAGE 3A CHRONIC KIDNEY DISEASE (MULTI): ICD-10-CM

## 2024-03-20 PROBLEM — S69.92XA WRIST INJURY, LEFT, INITIAL ENCOUNTER: Status: RESOLVED | Noted: 2023-04-06 | Resolved: 2024-03-20

## 2024-03-20 LAB
ALBUMIN SERPL BCP-MCNC: 4.1 G/DL (ref 3.4–5)
ALP SERPL-CCNC: 103 U/L (ref 33–136)
ALT SERPL W P-5'-P-CCNC: 13 U/L (ref 7–45)
ANION GAP SERPL CALC-SCNC: 11 MMOL/L (ref 10–20)
AST SERPL W P-5'-P-CCNC: 16 U/L (ref 9–39)
BASOPHILS # BLD AUTO: 0.07 X10*3/UL (ref 0–0.1)
BASOPHILS NFR BLD AUTO: 0.9 %
BILIRUB SERPL-MCNC: 0.3 MG/DL (ref 0–1.2)
BUN SERPL-MCNC: 16 MG/DL (ref 6–23)
CALCIUM SERPL-MCNC: 9.1 MG/DL (ref 8.6–10.3)
CHLORIDE SERPL-SCNC: 102 MMOL/L (ref 98–107)
CHOLEST SERPL-MCNC: 263 MG/DL (ref 0–199)
CHOLESTEROL/HDL RATIO: 4.4
CO2 SERPL-SCNC: 29 MMOL/L (ref 21–32)
CREAT SERPL-MCNC: 1.08 MG/DL (ref 0.5–1.05)
EGFRCR SERPLBLD CKD-EPI 2021: 52 ML/MIN/1.73M*2
EOSINOPHIL # BLD AUTO: 0.09 X10*3/UL (ref 0–0.4)
EOSINOPHIL NFR BLD AUTO: 1.2 %
ERYTHROCYTE [DISTWIDTH] IN BLOOD BY AUTOMATED COUNT: 17.4 % (ref 11.5–14.5)
FERRITIN SERPL-MCNC: 35 NG/ML (ref 8–150)
GLUCOSE SERPL-MCNC: 121 MG/DL (ref 74–99)
HCT VFR BLD AUTO: 52.3 % (ref 36–46)
HDLC SERPL-MCNC: 60.1 MG/DL
HGB BLD-MCNC: 16.5 G/DL (ref 12–16)
IMM GRANULOCYTES # BLD AUTO: 0.03 X10*3/UL (ref 0–0.5)
IMM GRANULOCYTES NFR BLD AUTO: 0.4 % (ref 0–0.9)
IRON SATN MFR SERPL: 44 % (ref 25–45)
IRON SERPL-MCNC: 174 UG/DL (ref 35–150)
LDLC SERPL CALC-MCNC: 155 MG/DL
LYMPHOCYTES # BLD AUTO: 2.44 X10*3/UL (ref 0.8–3)
LYMPHOCYTES NFR BLD AUTO: 31.2 %
MCH RBC QN AUTO: 27.4 PG (ref 26–34)
MCHC RBC AUTO-ENTMCNC: 31.5 G/DL (ref 32–36)
MCV RBC AUTO: 87 FL (ref 80–100)
MONOCYTES # BLD AUTO: 0.68 X10*3/UL (ref 0.05–0.8)
MONOCYTES NFR BLD AUTO: 8.7 %
NEUTROPHILS # BLD AUTO: 4.5 X10*3/UL (ref 1.6–5.5)
NEUTROPHILS NFR BLD AUTO: 57.6 %
NON HDL CHOLESTEROL: 203 MG/DL (ref 0–149)
NRBC BLD-RTO: 0 /100 WBCS (ref 0–0)
PLATELET # BLD AUTO: 358 X10*3/UL (ref 150–450)
POTASSIUM SERPL-SCNC: 4.4 MMOL/L (ref 3.5–5.3)
PROT SERPL-MCNC: 7.1 G/DL (ref 6.4–8.2)
RBC # BLD AUTO: 6.03 X10*6/UL (ref 4–5.2)
SODIUM SERPL-SCNC: 138 MMOL/L (ref 136–145)
TIBC SERPL-MCNC: 392 UG/DL (ref 240–445)
TRIGL SERPL-MCNC: 241 MG/DL (ref 0–149)
TSH SERPL-ACNC: 0.95 MIU/L (ref 0.44–3.98)
UIBC SERPL-MCNC: 218 UG/DL (ref 110–370)
VLDL: 48 MG/DL (ref 0–40)
WBC # BLD AUTO: 7.8 X10*3/UL (ref 4.4–11.3)

## 2024-03-20 PROCEDURE — 99397 PER PM REEVAL EST PAT 65+ YR: CPT | Performed by: FAMILY MEDICINE

## 2024-03-20 PROCEDURE — 84443 ASSAY THYROID STIM HORMONE: CPT

## 2024-03-20 PROCEDURE — 82728 ASSAY OF FERRITIN: CPT

## 2024-03-20 PROCEDURE — 36415 COLL VENOUS BLD VENIPUNCTURE: CPT

## 2024-03-20 PROCEDURE — 1159F MED LIST DOCD IN RCRD: CPT | Performed by: FAMILY MEDICINE

## 2024-03-20 PROCEDURE — 3079F DIAST BP 80-89 MM HG: CPT | Performed by: FAMILY MEDICINE

## 2024-03-20 PROCEDURE — 83540 ASSAY OF IRON: CPT

## 2024-03-20 PROCEDURE — 1036F TOBACCO NON-USER: CPT | Performed by: FAMILY MEDICINE

## 2024-03-20 PROCEDURE — 80061 LIPID PANEL: CPT

## 2024-03-20 PROCEDURE — 80053 COMPREHEN METABOLIC PANEL: CPT

## 2024-03-20 PROCEDURE — 85025 COMPLETE CBC W/AUTO DIFF WBC: CPT

## 2024-03-20 PROCEDURE — 83550 IRON BINDING TEST: CPT

## 2024-03-20 PROCEDURE — 3077F SYST BP >= 140 MM HG: CPT | Performed by: FAMILY MEDICINE

## 2024-03-20 RX ORDER — LOSARTAN POTASSIUM 25 MG/1
25 TABLET ORAL DAILY
Qty: 90 TABLET | Refills: 3 | COMMUNITY
Start: 2024-03-20 | End: 2024-04-30 | Stop reason: WASHOUT

## 2024-03-20 ASSESSMENT — ENCOUNTER SYMPTOMS
DIARRHEA: 0
DIFFICULTY URINATING: 0
SHORTNESS OF BREATH: 0
NERVOUS/ANXIOUS: 0
HEMATURIA: 0
BLOOD IN STOOL: 0
UNEXPECTED WEIGHT CHANGE: 0
APPETITE CHANGE: 0
CONSTIPATION: 0
TROUBLE SWALLOWING: 0
JOINT SWELLING: 0
FEVER: 0
PALPITATIONS: 0
SEIZURES: 0
CONFUSION: 0
COLOR CHANGE: 0

## 2024-03-20 ASSESSMENT — PATIENT HEALTH QUESTIONNAIRE - PHQ9
1. LITTLE INTEREST OR PLEASURE IN DOING THINGS: NOT AT ALL
SUM OF ALL RESPONSES TO PHQ9 QUESTIONS 1 AND 2: 0
2. FEELING DOWN, DEPRESSED OR HOPELESS: NOT AT ALL

## 2024-03-20 NOTE — PROGRESS NOTES
Subjective   Reason for Visit: Alyssa Jeffers is an 83 y.o. female here for a Medicare Wellness visit.          Reviewed all medications by prescribing practitioner or clinical pharmacist (such as prescriptions, OTCs, herbal therapies and supplements) and documented in the medical record.    HPI    Pleasant 83-year-old  female presents today for a follow-up:  - Hypothyroidism: Patient needs updated blood work  - Shortness of breath: Has been improved since restarting iron.  Patient has a history of polycythemia and she was having frequent blood draws which caused her to become anemic.  She feels that that is improved.  We discussed getting an updated CBC today.  She also has a history of shortness of breath with blood pressure medicine especially ACEs and ARB's.  We discussed at last visit holding her losartan.  Blood pressure is elevated today.  No chest pain shortness of breath palpitations syncope or near syncope.  We discussed diuretics which she refuses secondary to increased voiding, beta-blockers that can cause more shortness of breath.  Patient states that she felt somewhat start and would like to return to the  Patient Care Team:  Nico PITT DO as PCP - General  Nico PITT DO as PCP - [employer]vantage PCP  Nico PITT DO as PCP - [employer]vantage PCP     Review of Systems   Constitutional:  Negative for appetite change, fever and unexpected weight change.   HENT:  Negative for congestion and trouble swallowing.    Eyes:  Negative for visual disturbance.   Respiratory:  Negative for shortness of breath.    Cardiovascular:  Negative for chest pain, palpitations and leg swelling.   Gastrointestinal:  Negative for blood in stool, constipation and diarrhea.   Genitourinary:  Negative for difficulty urinating and hematuria.   Musculoskeletal:  Negative for gait problem and joint swelling.   Skin:  Negative for color change.   Allergic/Immunologic: Negative for  "immunocompromised state.   Neurological:  Negative for seizures and syncope.   Psychiatric/Behavioral:  Negative for confusion and suicidal ideas. The patient is not nervous/anxious.        Objective   Vitals:  /86   Pulse 75   Ht 1.664 m (5' 5.5\")   Wt 85.3 kg (188 lb)   SpO2 97%   BMI 30.81 kg/m²       Physical Exam  Constitutional:       General: She is not in acute distress.     Appearance: Normal appearance. She is not ill-appearing.   HENT:      Head: Normocephalic and atraumatic.      Right Ear: Tympanic membrane normal.      Left Ear: Tympanic membrane normal.      Nose: Nose normal.      Mouth/Throat:      Mouth: Mucous membranes are moist.   Eyes:      Pupils: Pupils are equal, round, and reactive to light.   Cardiovascular:      Rate and Rhythm: Normal rate and regular rhythm.      Heart sounds: No murmur heard.     No friction rub. No gallop.   Pulmonary:      Effort: Pulmonary effort is normal.      Breath sounds: Normal breath sounds.   Abdominal:      General: Abdomen is flat. There is no distension.      Palpations: Abdomen is soft.      Tenderness: There is no abdominal tenderness. There is no guarding.      Hernia: No hernia is present.   Musculoskeletal:         General: Normal range of motion.   Skin:     General: Skin is warm and dry.   Neurological:      General: No focal deficit present.      Mental Status: She is alert. Mental status is at baseline.      Cranial Nerves: No cranial nerve deficit.      Motor: No weakness.      Gait: Gait normal.   Psychiatric:         Mood and Affect: Mood normal.         Behavior: Behavior normal.         Thought Content: Thought content normal.         Judgment: Judgment normal.       Assessment/Plan   Problem List Items Addressed This Visit    None     Assessment:  -Hypothyroidism  Levo 100mg     -Hypertension      norvasc 10mg    restrart losartan 25mg    -Insomnia:  Remeron- very sensitive to it     -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  Not heard " today    -GERD     -CKD stage III     -left knee pain:   s/p meniscus tear     -Elev hct: secondary   followed by oncology- referral only if needed  Phlebotomy prn        -Right arm/shoulder pain: more bicep pain vs RTC, neg xray-US suggested that it RTC     -hearing loss w/ tinnitus:  Suggest ent/audiology       [facility] hearing info given      -SOB w/ exertion: suspect 2/2 anemia (from blood draw) vs thyroid (last tsh controlled/wt stable/hr nml) vs cardiac (no cp, wt stable,+anemia) vs PE (nml hr, no risk factors)  Hb: 10.8  Improving feso4 bid w/ vit c decreased to daily  Improved w/ losartan but increased and pt would like to restart     Health maintenance:  -Medicare: 2025  -Preventative: 2025   -Patient's up-to-date on Pneumovax/prevnar, shingrix  -Patient refuses cholesterol medicine, mammogram,dexa  -refused colon ca screen  -lab next visit         Follow-up 6 months

## 2024-04-16 ENCOUNTER — OFFICE VISIT (OUTPATIENT)
Dept: PRIMARY CARE | Facility: CLINIC | Age: 84
End: 2024-04-16
Payer: MEDICARE

## 2024-04-16 ENCOUNTER — APPOINTMENT (OUTPATIENT)
Dept: PRIMARY CARE | Facility: CLINIC | Age: 84
End: 2024-04-16
Payer: MEDICARE

## 2024-04-16 VITALS
BODY MASS INDEX: 30.06 KG/M2 | SYSTOLIC BLOOD PRESSURE: 122 MMHG | DIASTOLIC BLOOD PRESSURE: 76 MMHG | HEART RATE: 110 BPM | OXYGEN SATURATION: 96 % | WEIGHT: 183.4 LBS

## 2024-04-16 DIAGNOSIS — L98.9 SKIN LESION: Primary | ICD-10-CM

## 2024-04-16 PROCEDURE — 3078F DIAST BP <80 MM HG: CPT | Performed by: FAMILY MEDICINE

## 2024-04-16 PROCEDURE — 99212 OFFICE O/P EST SF 10 MIN: CPT | Performed by: FAMILY MEDICINE

## 2024-04-16 PROCEDURE — 1036F TOBACCO NON-USER: CPT | Performed by: FAMILY MEDICINE

## 2024-04-16 PROCEDURE — 1160F RVW MEDS BY RX/DR IN RCRD: CPT | Performed by: FAMILY MEDICINE

## 2024-04-16 PROCEDURE — 1159F MED LIST DOCD IN RCRD: CPT | Performed by: FAMILY MEDICINE

## 2024-04-16 PROCEDURE — 3074F SYST BP LT 130 MM HG: CPT | Performed by: FAMILY MEDICINE

## 2024-04-16 NOTE — PATIENT INSTRUCTIONS
This is a seborrhea keratosis  - but its bleeding - you need to have it removed  -   I will  talk to DR Salas if he wants to do it  - or if you need to see Dermatology  or a gyn     Keep triple antibiotic on it for now.

## 2024-04-16 NOTE — PROGRESS NOTES
Subjective   Patient ID: Alyssa Jeffers is a 83 y.o. female who presents for Rash.    HPI     Was appt with Josue - he had to leave     Rash in the groin - off and on for a while   She thinks its from sweating and heat    Itches in groin - gold bond helps     Today -  was drying off and saw blood   Knew she needed to get checked         Review of Systems    Objective   /76   Pulse 110   Wt 83.2 kg (183 lb 6.4 oz)   SpO2 96%   BMI 30.06 kg/m²     Physical Exam    Left groin - darkened skin -   Several SK   - the one that she is complaining of looks inflamed and irritated  - about 4 - 5 mm in size     Assessment/Plan   Problem List Items Addressed This Visit             ICD-10-CM       Medium    Skin lesion - Primary L98.9     Can make appt for removal .

## 2024-04-24 ENCOUNTER — TELEPHONE (OUTPATIENT)
Dept: PRIMARY CARE | Facility: CLINIC | Age: 84
End: 2024-04-24
Payer: MEDICARE

## 2024-04-30 ENCOUNTER — OFFICE VISIT (OUTPATIENT)
Dept: PRIMARY CARE | Facility: CLINIC | Age: 84
End: 2024-04-30
Payer: MEDICARE

## 2024-04-30 VITALS
BODY MASS INDEX: 29.99 KG/M2 | SYSTOLIC BLOOD PRESSURE: 132 MMHG | OXYGEN SATURATION: 91 % | WEIGHT: 183 LBS | DIASTOLIC BLOOD PRESSURE: 72 MMHG | HEART RATE: 108 BPM

## 2024-04-30 DIAGNOSIS — L82.1 SEBORRHEIC KERATOSES: Primary | ICD-10-CM

## 2024-04-30 PROCEDURE — 3078F DIAST BP <80 MM HG: CPT | Performed by: FAMILY MEDICINE

## 2024-04-30 PROCEDURE — 99212 OFFICE O/P EST SF 10 MIN: CPT | Performed by: FAMILY MEDICINE

## 2024-04-30 PROCEDURE — 1160F RVW MEDS BY RX/DR IN RCRD: CPT | Performed by: FAMILY MEDICINE

## 2024-04-30 PROCEDURE — 3075F SYST BP GE 130 - 139MM HG: CPT | Performed by: FAMILY MEDICINE

## 2024-04-30 PROCEDURE — 11402 EXC TR-EXT B9+MARG 1.1-2 CM: CPT | Performed by: FAMILY MEDICINE

## 2024-04-30 PROCEDURE — 1159F MED LIST DOCD IN RCRD: CPT | Performed by: FAMILY MEDICINE

## 2024-05-01 PROBLEM — L82.1 SEBORRHEIC KERATOSES: Status: ACTIVE | Noted: 2024-05-01

## 2024-05-01 ASSESSMENT — ENCOUNTER SYMPTOMS
TROUBLE SWALLOWING: 0
APPETITE CHANGE: 0
HEMATURIA: 0
UNEXPECTED WEIGHT CHANGE: 0
JOINT SWELLING: 0
CONSTIPATION: 0
FEVER: 0
BLOOD IN STOOL: 0
NERVOUS/ANXIOUS: 0
DIFFICULTY URINATING: 0
PALPITATIONS: 0
SHORTNESS OF BREATH: 0
CONFUSION: 0
COLOR CHANGE: 0
SEIZURES: 0
DIARRHEA: 0

## 2024-05-01 NOTE — PROGRESS NOTES
Subjective   Reason for Visit: Alyssa Jeffers is an 83 y.o. female here for   Skin lesion removal            Rash  Pertinent negatives include no congestion, diarrhea, fever or shortness of breath.     Patient presents for skin lesion removal on her left groin/inguinal area.  Patient recently was seen in the office by my partner for the concern of a skin lesion that was bleeding.  She has multiple seborrheic keratosis.  She was referred back to me for the actual procedure and/or dermatology.  Discussed with the patient the risks/benefits/alternatives to the procedure.  We discussed that this is not cancer therefore the lesions close today.  But they do cause irritation and bleeding.  Patient Care Team:  Nico PITT DO as PCP - General  Nico PITT DO as PCP - [employer] PCP     Review of Systems   Constitutional:  Negative for appetite change, fever and unexpected weight change.   HENT:  Negative for congestion and trouble swallowing.    Eyes:  Negative for visual disturbance.   Respiratory:  Negative for shortness of breath.    Cardiovascular:  Negative for chest pain, palpitations and leg swelling.   Gastrointestinal:  Negative for blood in stool, constipation and diarrhea.   Genitourinary:  Negative for difficulty urinating and hematuria.   Musculoskeletal:  Negative for gait problem and joint swelling.   Skin:  Positive for rash. Negative for color change.   Allergic/Immunologic: Negative for immunocompromised state.   Neurological:  Negative for seizures and syncope.   Psychiatric/Behavioral:  Negative for confusion and suicidal ideas. The patient is not nervous/anxious.        Objective   Vitals:  /72   Pulse 108   Wt 83 kg (183 lb)   SpO2 91%   BMI 29.99 kg/m²       Physical Exam  Skin:     Findings: Lesion (At the inguinal folds there are multiple seborrheic keratosis, on the left there is a larger 1 with no bleeding.  Measures approximately 15 mm x 10 mm) present.        Procedure: Excision of seborrheic keratosis  Risks, Benefits, Alternatives discussed- verbal consent given  Area cleaned with alcohol  anesthetized with: 2% lidocaine with epi 1 mL  Area cleaned with betadine x3  Procedure: Once cleaned anesthetized in normal sterile fashion I used a derma blade to remove the lesion.  Hyfrecator was used for hemostasis.  There was no bleeding.  Bacitracin and a gauze was placed and aftercare instructions given  Patient tolerated well, no complications, written instructions given   Assessment/Plan   Problem List Items Addressed This Visit    None     Assessment:  -Hypothyroidism  Levo 100mg     -Hypertension      norvasc 10mg   Hold losartan secondary to dizziness    -Insomnia:  Remeron- very sensitive to it     -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  Not heard today    -GERD     -CKD stage III     -left knee pain:   s/p meniscus tear     -Elev hct: secondary   followed by oncology- referral only if needed  Phlebotomy prn        -Right arm/shoulder pain: more bicep pain vs RTC, neg xray-US suggested that it RTC     -hearing loss w/ tinnitus:  Suggest ent/audiology       [facility] hearing info given      -SOB w/ exertion: suspect 2/2 anemia (from blood draw) vs thyroid (last tsh controlled/wt stable/hr nml) vs cardiac (no cp, wt stable,+anemia) vs PE (nml hr, no risk factors)  Hb: 10.8  Improving feso4 bid w/ vit c decreased to daily  Improved w/ losartan but increased and pt would like to restart     Health maintenance:  -Medicare: 2025  -Preventative: 2025   -Patient's up-to-date on Pneumovax/prevnar, shingrix  -Patient refuses cholesterol medicine, mammogram,dexa  -refused colon ca screen  -lab next visit         Follow-up 6 months

## 2024-05-17 DIAGNOSIS — E03.9 ADULT HYPOTHYROIDISM: ICD-10-CM

## 2024-05-17 DIAGNOSIS — I10 BENIGN HYPERTENSION: ICD-10-CM

## 2024-05-17 RX ORDER — LEVOTHYROXINE SODIUM 100 UG/1
TABLET ORAL
Qty: 90 TABLET | Refills: 0 | Status: SHIPPED | OUTPATIENT
Start: 2024-05-17

## 2024-05-17 RX ORDER — AMLODIPINE BESYLATE 10 MG/1
TABLET ORAL
Qty: 90 TABLET | Refills: 3 | Status: SHIPPED | OUTPATIENT
Start: 2024-05-17

## 2024-06-03 ENCOUNTER — APPOINTMENT (OUTPATIENT)
Dept: PRIMARY CARE | Facility: CLINIC | Age: 84
End: 2024-06-03
Payer: MEDICARE

## 2024-06-03 DIAGNOSIS — R71.8 ELEVATED HEMATOCRIT: Primary | ICD-10-CM

## 2024-06-03 LAB
BASOPHILS # BLD AUTO: 0.08 X10*3/UL (ref 0–0.1)
BASOPHILS NFR BLD AUTO: 1 %
EOSINOPHIL # BLD AUTO: 0.16 X10*3/UL (ref 0–0.4)
EOSINOPHIL NFR BLD AUTO: 2.1 %
ERYTHROCYTE [DISTWIDTH] IN BLOOD BY AUTOMATED COUNT: 15.3 % (ref 11.5–14.5)
HCT VFR BLD AUTO: 52.8 % (ref 36–46)
HGB BLD-MCNC: 16.2 G/DL (ref 12–16)
IMM GRANULOCYTES # BLD AUTO: 0.02 X10*3/UL (ref 0–0.5)
IMM GRANULOCYTES NFR BLD AUTO: 0.3 % (ref 0–0.9)
LYMPHOCYTES # BLD AUTO: 2.07 X10*3/UL (ref 0.8–3)
LYMPHOCYTES NFR BLD AUTO: 26.6 %
MCH RBC QN AUTO: 28.6 PG (ref 26–34)
MCHC RBC AUTO-ENTMCNC: 30.7 G/DL (ref 32–36)
MCV RBC AUTO: 93 FL (ref 80–100)
MONOCYTES # BLD AUTO: 0.66 X10*3/UL (ref 0.05–0.8)
MONOCYTES NFR BLD AUTO: 8.5 %
NEUTROPHILS # BLD AUTO: 4.8 X10*3/UL (ref 1.6–5.5)
NEUTROPHILS NFR BLD AUTO: 61.5 %
NRBC BLD-RTO: 0 /100 WBCS (ref 0–0)
PLATELET # BLD AUTO: 324 X10*3/UL (ref 150–450)
RBC # BLD AUTO: 5.66 X10*6/UL (ref 4–5.2)
WBC # BLD AUTO: 7.8 X10*3/UL (ref 4.4–11.3)

## 2024-06-03 PROCEDURE — 85025 COMPLETE CBC W/AUTO DIFF WBC: CPT

## 2024-06-03 PROCEDURE — 36415 COLL VENOUS BLD VENIPUNCTURE: CPT

## 2024-06-06 ENCOUNTER — TELEPHONE (OUTPATIENT)
Dept: PRIMARY CARE | Facility: CLINIC | Age: 84
End: 2024-06-06
Payer: MEDICARE

## 2024-06-07 DIAGNOSIS — R71.8 ELEVATED HEMATOCRIT: Primary | ICD-10-CM

## 2024-08-08 DIAGNOSIS — K21.9 GASTROESOPHAGEAL REFLUX DISEASE WITHOUT ESOPHAGITIS: ICD-10-CM

## 2024-08-08 DIAGNOSIS — E03.9 ADULT HYPOTHYROIDISM: ICD-10-CM

## 2024-08-09 RX ORDER — OMEPRAZOLE 20 MG/1
20 CAPSULE, DELAYED RELEASE ORAL
Qty: 90 CAPSULE | Refills: 1 | Status: SHIPPED | OUTPATIENT
Start: 2024-08-09

## 2024-08-09 RX ORDER — LEVOTHYROXINE SODIUM 100 UG/1
100 TABLET ORAL DAILY
Qty: 90 TABLET | Refills: 0 | Status: SHIPPED | OUTPATIENT
Start: 2024-08-09

## 2024-08-30 ENCOUNTER — OFFICE VISIT (OUTPATIENT)
Dept: HEMATOLOGY/ONCOLOGY | Facility: CLINIC | Age: 84
End: 2024-08-30
Payer: MEDICARE

## 2024-08-30 ENCOUNTER — LAB (OUTPATIENT)
Dept: LAB | Facility: LAB | Age: 84
End: 2024-08-30
Payer: MEDICARE

## 2024-08-30 VITALS
SYSTOLIC BLOOD PRESSURE: 147 MMHG | OXYGEN SATURATION: 96 % | RESPIRATION RATE: 17 BRPM | WEIGHT: 182.32 LBS | TEMPERATURE: 96.1 F | HEART RATE: 116 BPM | BODY MASS INDEX: 29.88 KG/M2 | DIASTOLIC BLOOD PRESSURE: 80 MMHG

## 2024-08-30 DIAGNOSIS — D75.1 POLYCYTHEMIA, SECONDARY: Primary | ICD-10-CM

## 2024-08-30 DIAGNOSIS — R71.8 ELEVATED HEMATOCRIT: ICD-10-CM

## 2024-08-30 DIAGNOSIS — D75.1 POLYCYTHEMIA, SECONDARY: ICD-10-CM

## 2024-08-30 LAB
BASOPHILS # BLD AUTO: 0.09 X10*3/UL (ref 0–0.1)
BASOPHILS NFR BLD AUTO: 1 %
EOSINOPHIL # BLD AUTO: 0.17 X10*3/UL (ref 0–0.4)
EOSINOPHIL NFR BLD AUTO: 1.8 %
ERYTHROCYTE [DISTWIDTH] IN BLOOD BY AUTOMATED COUNT: 13.9 % (ref 11.5–14.5)
HCT VFR BLD AUTO: 48.4 % (ref 36–46)
HGB BLD-MCNC: 15.7 G/DL (ref 12–16)
IMM GRANULOCYTES # BLD AUTO: 0.02 X10*3/UL (ref 0–0.5)
IMM GRANULOCYTES NFR BLD AUTO: 0.2 % (ref 0–0.9)
LYMPHOCYTES # BLD AUTO: 2.54 X10*3/UL (ref 0.8–3)
LYMPHOCYTES NFR BLD AUTO: 26.9 %
MCH RBC QN AUTO: 29 PG (ref 26–34)
MCHC RBC AUTO-ENTMCNC: 32.4 G/DL (ref 32–36)
MCV RBC AUTO: 90 FL (ref 80–100)
MONOCYTES # BLD AUTO: 0.78 X10*3/UL (ref 0.05–0.8)
MONOCYTES NFR BLD AUTO: 8.3 %
NEUTROPHILS # BLD AUTO: 5.85 X10*3/UL (ref 1.6–5.5)
NEUTROPHILS NFR BLD AUTO: 61.8 %
NRBC BLD-RTO: 0 /100 WBCS (ref 0–0)
PLATELET # BLD AUTO: 313 X10*3/UL (ref 150–450)
RBC # BLD AUTO: 5.41 X10*6/UL (ref 4–5.2)
WBC # BLD AUTO: 9.5 X10*3/UL (ref 4.4–11.3)

## 2024-08-30 PROCEDURE — 85025 COMPLETE CBC W/AUTO DIFF WBC: CPT

## 2024-08-30 PROCEDURE — 3077F SYST BP >= 140 MM HG: CPT | Performed by: INTERNAL MEDICINE

## 2024-08-30 PROCEDURE — 99214 OFFICE O/P EST MOD 30 MIN: CPT | Performed by: INTERNAL MEDICINE

## 2024-08-30 PROCEDURE — 36415 COLL VENOUS BLD VENIPUNCTURE: CPT

## 2024-08-30 PROCEDURE — 3079F DIAST BP 80-89 MM HG: CPT | Performed by: INTERNAL MEDICINE

## 2024-08-30 PROCEDURE — 1159F MED LIST DOCD IN RCRD: CPT | Performed by: INTERNAL MEDICINE

## 2024-08-30 PROCEDURE — 1126F AMNT PAIN NOTED NONE PRSNT: CPT | Performed by: INTERNAL MEDICINE

## 2024-08-30 RX ORDER — EPINEPHRINE 0.3 MG/.3ML
0.3 INJECTION SUBCUTANEOUS EVERY 5 MIN PRN
OUTPATIENT
Start: 2024-10-01

## 2024-08-30 RX ORDER — ALBUTEROL SULFATE 0.83 MG/ML
3 SOLUTION RESPIRATORY (INHALATION) AS NEEDED
OUTPATIENT
Start: 2024-10-01

## 2024-08-30 RX ORDER — FAMOTIDINE 10 MG/ML
20 INJECTION INTRAVENOUS ONCE AS NEEDED
OUTPATIENT
Start: 2024-10-01

## 2024-08-30 RX ORDER — DIPHENHYDRAMINE HYDROCHLORIDE 50 MG/ML
50 INJECTION INTRAMUSCULAR; INTRAVENOUS AS NEEDED
OUTPATIENT
Start: 2024-10-01

## 2024-08-30 ASSESSMENT — PATIENT HEALTH QUESTIONNAIRE - PHQ9
2. FEELING DOWN, DEPRESSED OR HOPELESS: NOT AT ALL
1. LITTLE INTEREST OR PLEASURE IN DOING THINGS: NOT AT ALL
SUM OF ALL RESPONSES TO PHQ9 QUESTIONS 1 AND 2: 0

## 2024-08-30 ASSESSMENT — ENCOUNTER SYMPTOMS
DEPRESSION: 0
LOSS OF SENSATION IN FEET: 0
OCCASIONAL FEELINGS OF UNSTEADINESS: 0

## 2024-08-30 ASSESSMENT — COLUMBIA-SUICIDE SEVERITY RATING SCALE - C-SSRS
1. IN THE PAST MONTH, HAVE YOU WISHED YOU WERE DEAD OR WISHED YOU COULD GO TO SLEEP AND NOT WAKE UP?: NO
6. HAVE YOU EVER DONE ANYTHING, STARTED TO DO ANYTHING, OR PREPARED TO DO ANYTHING TO END YOUR LIFE?: NO
2. HAVE YOU ACTUALLY HAD ANY THOUGHTS OF KILLING YOURSELF?: NO

## 2024-08-30 ASSESSMENT — PAIN SCALES - GENERAL: PAINLEVEL: 0-NO PAIN

## 2024-08-30 NOTE — PROGRESS NOTES
Patient ID: Alyssa Jeffers is a 83 y.o. female.  Referring Physician: Nico PITT DO  [id number] [id number]  Primary Care Provider: Nico Salas DO  Visit Type:  Initial Visit     Subjective    HPI  Referred for Polycythemia: VERITO 2 Negative.  pt is a pleasant 79 year old  female who was referred by Dr. Salas for evaluation and management of polycythemia     labs obtained on 10/2/2019 revealed elevated rbc 5.31 and elevated hct 49.8 with normal hgb, wbc, and plt count. repeat labs obtained on 10/14/2019 revealed elevated hgb 16.3 and elevated hct 51.7. erythropoietin level normal. Jak2 not detected.  Review of Systems   Constitutional: Negative.    HENT:  Negative.     Eyes: Negative.    Respiratory: Negative.     Cardiovascular: Negative.         Objective   BSA: 1.95 meters squared  /80 (BP Location: Left arm, Patient Position: Sitting, BP Cuff Size: Large adult)   Pulse (!) 116   Temp 35.6 °C (96.1 °F) (Temporal)   Resp 17   Wt 82.7 kg (182 lb 5.1 oz)   SpO2 96%   BMI 29.88 kg/m²      has a past medical history of Encounter for screening for malignant neoplasm of colon (09/19/2017), Enterocolitis due to Clostridium difficile, not specified as recurrent, Other abnormality of red blood cells (04/05/2018), Personal history of retained foreign body fully removed (08/22/2017), and Sebaceous cyst (08/22/2017).   has a past surgical history that includes Hysterectomy (03/07/2017) and Knee arthroscopy w/ debridement (03/02/2020).  Family History   Problem Relation Name Age of Onset    Hypertension Mother      Parkinsonism Mother      Aneurysm Father       Oncology History    No history exists.       Alyssa Jeffers  reports that she has never smoked. She has never used smokeless tobacco.  She  reports no history of alcohol use.  She  reports no history of drug use.    Physical Exam  Constitutional:       Appearance: Normal appearance.   HENT:      Head:  Normocephalic and atraumatic.   Eyes:      Extraocular Movements: Extraocular movements intact.      Pupils: Pupils are equal, round, and reactive to light.   Neurological:      Mental Status: She is alert.         WBC   Date/Time Value Ref Range Status   06/03/2024 01:16 PM 7.8 4.4 - 11.3 x10*3/uL Final   03/20/2024 11:42 AM 7.8 4.4 - 11.3 x10*3/uL Final   04/19/2023 03:12 PM 7.9 4.4 - 11.3 x10E9/L Final   02/27/2023 10:01 AM 7.5 4.4 - 11.3 x10E9/L Final   04/06/2022 05:22 PM 7.5 4.4 - 11.3 x10E9/L Final     nRBC   Date Value Ref Range Status   06/03/2024 0.0 0.0 - 0.0 /100 WBCs Final   03/20/2024 0.0 0.0 - 0.0 /100 WBCs Final   09/11/2020 0.0 0.0 - 0.0 /100 WBC Final   10/14/2019 0.0 0.0 - 0.0 /100 WBC Final   10/02/2019 0.0 0.0 - 0.0 /100 WBC Final     RBC   Date Value Ref Range Status   06/03/2024 5.66 (H) 4.00 - 5.20 x10*6/uL Final   03/20/2024 6.03 (H) 4.00 - 5.20 x10*6/uL Final   04/19/2023 5.54 (H) 4.00 - 5.20 x10E12/L Final   02/27/2023 5.26 (H) 4.00 - 5.20 x10E12/L Final   04/06/2022 5.75 (H) 4.00 - 5.20 x10E12/L Final     POC Hemoglobin   Date Value Ref Range Status   01/08/2024 12.6 12 - 16 g/dL Final   12/11/2023 10.8 (A) 12 - 16 g/dL Final     Hemoglobin   Date Value Ref Range Status   06/03/2024 16.2 (H) 12.0 - 16.0 g/dL Final   03/20/2024 16.5 (H) 12.0 - 16.0 g/dL Final   04/19/2023 15.1 12.0 - 16.0 g/dL Final   02/27/2023 14.3 12.0 - 16.0 g/dL Final   04/06/2022 16.5 (H) 12.0 - 16.0 g/dL Final     Hematocrit   Date Value Ref Range Status   06/03/2024 52.8 (H) 36.0 - 46.0 % Final   03/20/2024 52.3 (H) 36.0 - 46.0 % Final   04/19/2023 48.8 (H) 36.0 - 46.0 % Final   02/27/2023 45.5 36.0 - 46.0 % Final   04/06/2022 51.4 (H) 36.0 - 46.0 % Final     MCV   Date/Time Value Ref Range Status   06/03/2024 01:16 PM 93 80 - 100 fL Final   03/20/2024 11:42 AM 87 80 - 100 fL Final   04/19/2023 03:12 PM 88 80 - 100 fL Final   02/27/2023 10:01 AM 87 80 - 100 fL Final   04/06/2022 05:22 PM 89 80 - 100 fL Final  "    MCH   Date/Time Value Ref Range Status   06/03/2024 01:16 PM 28.6 26.0 - 34.0 pg Final   03/20/2024 11:42 AM 27.4 26.0 - 34.0 pg Final     MCHC   Date/Time Value Ref Range Status   06/03/2024 01:16 PM 30.7 (L) 32.0 - 36.0 g/dL Final   03/20/2024 11:42 AM 31.5 (L) 32.0 - 36.0 g/dL Final   04/19/2023 03:12 PM 30.9 (L) 32.0 - 36.0 g/dL Final   02/27/2023 10:01 AM 31.4 (L) 32.0 - 36.0 g/dL Final   04/06/2022 05:22 PM 32.1 32.0 - 36.0 g/dL Final     RDW   Date/Time Value Ref Range Status   06/03/2024 01:16 PM 15.3 (H) 11.5 - 14.5 % Final   03/20/2024 11:42 AM 17.4 (H) 11.5 - 14.5 % Final   04/19/2023 03:12 PM 15.8 (H) 11.5 - 14.5 % Final   02/27/2023 10:01 AM 15.0 (H) 11.5 - 14.5 % Final   04/06/2022 05:22 PM 14.6 (H) 11.5 - 14.5 % Final     Platelets   Date/Time Value Ref Range Status   06/03/2024 01:16  150 - 450 x10*3/uL Final   03/20/2024 11:42  150 - 450 x10*3/uL Final   04/19/2023 03:12  150 - 450 x10E9/L Final   02/27/2023 10:01  150 - 450 x10E9/L Final   04/06/2022 05:22  150 - 450 x10E9/L Final     No results found for: \"MPV\"  Neutrophils %   Date/Time Value Ref Range Status   06/03/2024 01:16 PM 61.5 40.0 - 80.0 % Final   03/20/2024 11:42 AM 57.6 40.0 - 80.0 % Final   10/14/2019 02:33 PM 72.3 40.0 - 80.0 % Final     Immature Granulocytes %, Automated   Date/Time Value Ref Range Status   06/03/2024 01:16 PM 0.3 0.0 - 0.9 % Final     Comment:     Immature Granulocyte Count (IG) includes promyelocytes, myelocytes and metamyelocytes but does not include bands. Percent differential counts (%) should be interpreted in the context of the absolute cell counts (cells/UL).   03/20/2024 11:42 AM 0.4 0.0 - 0.9 % Final     Comment:     Immature Granulocyte Count (IG) includes promyelocytes, myelocytes and metamyelocytes but does not include bands. Percent differential counts (%) should be interpreted in the context of the absolute cell counts (cells/UL).   10/14/2019 02:33 PM 0.3 0.0 - 0.9 " % Final     Comment:      Percent differential counts (%) should be interpreted in the   context of the absolute cell counts (cells/L).       Lymphocytes %   Date/Time Value Ref Range Status   06/03/2024 01:16 PM 26.6 13.0 - 44.0 % Final   03/20/2024 11:42 AM 31.2 13.0 - 44.0 % Final   10/14/2019 02:33 PM 18.5 13.0 - 44.0 % Final     Monocytes %   Date/Time Value Ref Range Status   06/03/2024 01:16 PM 8.5 2.0 - 10.0 % Final   03/20/2024 11:42 AM 8.7 2.0 - 10.0 % Final   10/14/2019 02:33 PM 6.8 2.0 - 10.0 % Final     Eosinophils %   Date/Time Value Ref Range Status   06/03/2024 01:16 PM 2.1 0.0 - 6.0 % Final   03/20/2024 11:42 AM 1.2 0.0 - 6.0 % Final   10/14/2019 02:33 PM 1.2 0.0 - 6.0 % Final     Basophils %   Date/Time Value Ref Range Status   06/03/2024 01:16 PM 1.0 0.0 - 2.0 % Final   03/20/2024 11:42 AM 0.9 0.0 - 2.0 % Final   10/14/2019 02:33 PM 0.9 0.0 - 2.0 % Final     Neutrophils Absolute   Date/Time Value Ref Range Status   06/03/2024 01:16 PM 4.80 1.60 - 5.50 x10*3/uL Final     Comment:     Percent differential counts (%) should be interpreted in the context of the absolute cell counts (cells/uL).   03/20/2024 11:42 AM 4.50 1.60 - 5.50 x10*3/uL Final     Comment:     Percent differential counts (%) should be interpreted in the context of the absolute cell counts (cells/uL).   10/14/2019 02:33 PM 4.88 1.60 - 5.50 x10E9/L Final     Immature Granulocytes Absolute, Automated   Date/Time Value Ref Range Status   06/03/2024 01:16 PM 0.02 0.00 - 0.50 x10*3/uL Final   03/20/2024 11:42 AM 0.03 0.00 - 0.50 x10*3/uL Final     Lymphocytes Absolute   Date/Time Value Ref Range Status   06/03/2024 01:16 PM 2.07 0.80 - 3.00 x10*3/uL Final   03/20/2024 11:42 AM 2.44 0.80 - 3.00 x10*3/uL Final   10/14/2019 02:33 PM 1.25 0.80 - 3.00 x10E9/L Final     Monocytes Absolute   Date/Time Value Ref Range Status   06/03/2024 01:16 PM 0.66 0.05 - 0.80 x10*3/uL Final   03/20/2024 11:42 AM 0.68 0.05 - 0.80 x10*3/uL Final   10/14/2019  "02:33 PM 0.46 0.05 - 0.80 x10E9/L Final     Eosinophils Absolute   Date/Time Value Ref Range Status   06/03/2024 01:16 PM 0.16 0.00 - 0.40 x10*3/uL Final   03/20/2024 11:42 AM 0.09 0.00 - 0.40 x10*3/uL Final   10/14/2019 02:33 PM 0.08 0.00 - 0.40 x10E9/L Final     Basophils Absolute   Date/Time Value Ref Range Status   06/03/2024 01:16 PM 0.08 0.00 - 0.10 x10*3/uL Final   03/20/2024 11:42 AM 0.07 0.00 - 0.10 x10*3/uL Final   10/14/2019 02:33 PM 0.06 0.00 - 0.10 x10E9/L Final       No components found for: \"PT\"  No results found for: \"APTT\"    Assessment/Plan      80 year old  female who presents with secondary polycythemia, likely secondary:   RX Phlebotomy: Q 3 monthly     Diagnoses and all orders for this visit:  Polycythemia, secondary  -     CBC and Auto Differential; Future  -     Clinic Appointment Request Follow Up; Future  -     Infusion Appointment Request; Future  Elevated hematocrit  -     Referral to Hematology and Oncology  -     CBC and Auto Differential; Future  -     Clinic Appointment Request Follow Up; Future  -     Infusion Appointment Request; Future  Other orders  -     sodium chloride 0.9 % bolus 500 mL  -     sodium chloride 0.9 % bolus 500 mL  -     dextrose 5 % in water (D5W) bolus  -     diphenhydrAMINE (BENADryl) injection 50 mg  -     methylPREDNISolone sod succinate (SOLU-Medrol) 40 mg/mL injection 40 mg  -     famotidine PF (Pepcid) injection 20 mg  -     EPINEPHrine (Epipen) injection syringe 0.3 mg  -     albuterol 2.5 mg /3 mL (0.083 %) nebulizer solution 3 mL           William-Jesse Mendenhall MD                         "

## 2024-08-30 NOTE — PATIENT INSTRUCTIONS
Today you met with your hematologist/oncologist.  Recent labs were discussed and questions answered.  Scheduling orders were placed.  While we appreciate that you verbalized understanding, if any questions arise after leaving, please do not hesitate to call the office to discuss.  [phone] Tamela Mendenhall has ordered phlebotomies as needed for you. A  will call you to set that up. Dr Mendenhall will see you in 6 months.

## 2024-09-02 ASSESSMENT — ENCOUNTER SYMPTOMS
EYES NEGATIVE: 1
RESPIRATORY NEGATIVE: 1
CARDIOVASCULAR NEGATIVE: 1
CONSTITUTIONAL NEGATIVE: 1

## 2024-10-01 ENCOUNTER — INFUSION (OUTPATIENT)
Dept: HEMATOLOGY/ONCOLOGY | Facility: CLINIC | Age: 84
End: 2024-10-01
Payer: MEDICARE

## 2024-10-01 VITALS
BODY MASS INDEX: 29.97 KG/M2 | DIASTOLIC BLOOD PRESSURE: 75 MMHG | SYSTOLIC BLOOD PRESSURE: 110 MMHG | HEART RATE: 106 BPM | WEIGHT: 179.9 LBS | RESPIRATION RATE: 16 BRPM | OXYGEN SATURATION: 95 % | HEIGHT: 65 IN | TEMPERATURE: 97.5 F

## 2024-10-01 DIAGNOSIS — D75.1 POLYCYTHEMIA, SECONDARY: ICD-10-CM

## 2024-10-01 DIAGNOSIS — E03.9 ADULT HYPOTHYROIDISM: ICD-10-CM

## 2024-10-01 LAB
ANION GAP SERPL CALC-SCNC: 11 MMOL/L (ref 10–20)
BASOPHILS # BLD AUTO: 0.05 X10*3/UL (ref 0–0.1)
BASOPHILS NFR BLD AUTO: 0.6 %
BUN SERPL-MCNC: 14 MG/DL (ref 6–23)
CALCIUM SERPL-MCNC: 9.1 MG/DL (ref 8.6–10.3)
CHLORIDE SERPL-SCNC: 103 MMOL/L (ref 98–107)
CO2 SERPL-SCNC: 27 MMOL/L (ref 21–32)
CREAT SERPL-MCNC: 0.9 MG/DL (ref 0.5–1.05)
EGFRCR SERPLBLD CKD-EPI 2021: 64 ML/MIN/1.73M*2
EOSINOPHIL # BLD AUTO: 0.2 X10*3/UL (ref 0–0.4)
EOSINOPHIL NFR BLD AUTO: 2.5 %
ERYTHROCYTE [DISTWIDTH] IN BLOOD BY AUTOMATED COUNT: 14.1 % (ref 11.5–14.5)
FERRITIN SERPL-MCNC: 36 NG/ML (ref 8–150)
GLUCOSE SERPL-MCNC: 106 MG/DL (ref 74–99)
HCT VFR BLD AUTO: 51 % (ref 36–46)
HGB BLD-MCNC: 16.5 G/DL (ref 12–16)
IMM GRANULOCYTES # BLD AUTO: 0.02 X10*3/UL (ref 0–0.5)
IMM GRANULOCYTES NFR BLD AUTO: 0.3 % (ref 0–0.9)
IRON SATN MFR SERPL: 22 % (ref 25–45)
IRON SERPL-MCNC: 77 UG/DL (ref 35–150)
LYMPHOCYTES # BLD AUTO: 2.46 X10*3/UL (ref 0.8–3)
LYMPHOCYTES NFR BLD AUTO: 31.1 %
MCH RBC QN AUTO: 28.5 PG (ref 26–34)
MCHC RBC AUTO-ENTMCNC: 32.4 G/DL (ref 32–36)
MCV RBC AUTO: 88 FL (ref 80–100)
MONOCYTES # BLD AUTO: 0.69 X10*3/UL (ref 0.05–0.8)
MONOCYTES NFR BLD AUTO: 8.7 %
NEUTROPHILS # BLD AUTO: 4.5 X10*3/UL (ref 1.6–5.5)
NEUTROPHILS NFR BLD AUTO: 56.8 %
NRBC BLD-RTO: ABNORMAL /100{WBCS}
PLATELET # BLD AUTO: 264 X10*3/UL (ref 150–450)
POTASSIUM SERPL-SCNC: 4.3 MMOL/L (ref 3.5–5.3)
RBC # BLD AUTO: 5.78 X10*6/UL (ref 4–5.2)
RBC MORPH BLD: NORMAL
SODIUM SERPL-SCNC: 137 MMOL/L (ref 136–145)
TIBC SERPL-MCNC: 343 UG/DL (ref 240–445)
UIBC SERPL-MCNC: 266 UG/DL (ref 110–370)
WBC # BLD AUTO: 7.9 X10*3/UL (ref 4.4–11.3)

## 2024-10-01 PROCEDURE — 82728 ASSAY OF FERRITIN: CPT

## 2024-10-01 PROCEDURE — 84443 ASSAY THYROID STIM HORMONE: CPT

## 2024-10-01 PROCEDURE — 83540 ASSAY OF IRON: CPT

## 2024-10-01 PROCEDURE — 85025 COMPLETE CBC W/AUTO DIFF WBC: CPT

## 2024-10-01 PROCEDURE — 80048 BASIC METABOLIC PNL TOTAL CA: CPT

## 2024-10-01 PROCEDURE — 99195 PHLEBOTOMY: CPT

## 2024-10-01 RX ORDER — FAMOTIDINE 10 MG/ML
20 INJECTION INTRAVENOUS ONCE AS NEEDED
OUTPATIENT
Start: 2025-01-07

## 2024-10-01 RX ORDER — DIPHENHYDRAMINE HYDROCHLORIDE 50 MG/ML
50 INJECTION INTRAMUSCULAR; INTRAVENOUS AS NEEDED
Status: DISCONTINUED | OUTPATIENT
Start: 2024-10-01 | End: 2024-10-01 | Stop reason: HOSPADM

## 2024-10-01 RX ORDER — EPINEPHRINE 0.3 MG/.3ML
0.3 INJECTION SUBCUTANEOUS EVERY 5 MIN PRN
OUTPATIENT
Start: 2025-01-07

## 2024-10-01 RX ORDER — ALBUTEROL SULFATE 0.83 MG/ML
3 SOLUTION RESPIRATORY (INHALATION) AS NEEDED
Status: DISCONTINUED | OUTPATIENT
Start: 2024-10-01 | End: 2024-10-01 | Stop reason: HOSPADM

## 2024-10-01 RX ORDER — FAMOTIDINE 10 MG/ML
20 INJECTION INTRAVENOUS ONCE AS NEEDED
Status: DISCONTINUED | OUTPATIENT
Start: 2024-10-01 | End: 2024-10-01 | Stop reason: HOSPADM

## 2024-10-01 RX ORDER — EPINEPHRINE 0.3 MG/.3ML
0.3 INJECTION SUBCUTANEOUS EVERY 5 MIN PRN
Status: DISCONTINUED | OUTPATIENT
Start: 2024-10-01 | End: 2024-10-01 | Stop reason: HOSPADM

## 2024-10-01 RX ORDER — DIPHENHYDRAMINE HYDROCHLORIDE 50 MG/ML
50 INJECTION INTRAMUSCULAR; INTRAVENOUS AS NEEDED
OUTPATIENT
Start: 2025-01-07

## 2024-10-01 RX ORDER — ALBUTEROL SULFATE 0.83 MG/ML
3 SOLUTION RESPIRATORY (INHALATION) AS NEEDED
OUTPATIENT
Start: 2025-01-07

## 2024-10-01 ASSESSMENT — PAIN SCALES - GENERAL: PAINLEVEL: 0-NO PAIN

## 2024-10-03 ENCOUNTER — APPOINTMENT (OUTPATIENT)
Dept: PRIMARY CARE | Facility: CLINIC | Age: 84
End: 2024-10-03
Payer: MEDICARE

## 2024-10-03 VITALS
OXYGEN SATURATION: 98 % | DIASTOLIC BLOOD PRESSURE: 72 MMHG | SYSTOLIC BLOOD PRESSURE: 124 MMHG | WEIGHT: 178.8 LBS | BODY MASS INDEX: 29.4 KG/M2 | HEART RATE: 68 BPM

## 2024-10-03 DIAGNOSIS — D75.1 POLYCYTHEMIA, SECONDARY: ICD-10-CM

## 2024-10-03 DIAGNOSIS — N18.31 STAGE 3A CHRONIC KIDNEY DISEASE (MULTI): ICD-10-CM

## 2024-10-03 DIAGNOSIS — I10 BENIGN HYPERTENSION: ICD-10-CM

## 2024-10-03 DIAGNOSIS — Z23 FLU VACCINE NEED: ICD-10-CM

## 2024-10-03 DIAGNOSIS — F41.9 ANXIETY: ICD-10-CM

## 2024-10-03 DIAGNOSIS — E03.9 ADULT HYPOTHYROIDISM: Primary | ICD-10-CM

## 2024-10-03 DIAGNOSIS — Z13.0 SCREENING FOR DEFICIENCY ANEMIA: ICD-10-CM

## 2024-10-03 LAB — TSH SERPL-ACNC: 1.39 MIU/L (ref 0.44–3.98)

## 2024-10-03 PROCEDURE — 3074F SYST BP LT 130 MM HG: CPT | Performed by: FAMILY MEDICINE

## 2024-10-03 PROCEDURE — G0008 ADMIN INFLUENZA VIRUS VAC: HCPCS | Performed by: FAMILY MEDICINE

## 2024-10-03 PROCEDURE — 1159F MED LIST DOCD IN RCRD: CPT | Performed by: FAMILY MEDICINE

## 2024-10-03 PROCEDURE — G2211 COMPLEX E/M VISIT ADD ON: HCPCS | Performed by: FAMILY MEDICINE

## 2024-10-03 PROCEDURE — 90662 IIV NO PRSV INCREASED AG IM: CPT | Performed by: FAMILY MEDICINE

## 2024-10-03 PROCEDURE — 3078F DIAST BP <80 MM HG: CPT | Performed by: FAMILY MEDICINE

## 2024-10-03 PROCEDURE — 99213 OFFICE O/P EST LOW 20 MIN: CPT | Performed by: FAMILY MEDICINE

## 2024-10-03 ASSESSMENT — ENCOUNTER SYMPTOMS
DIFFICULTY URINATING: 0
SHORTNESS OF BREATH: 0
APPETITE CHANGE: 0
JOINT SWELLING: 0
CONFUSION: 0
CONSTIPATION: 0
HEMATURIA: 0
COLOR CHANGE: 0
NERVOUS/ANXIOUS: 0
DIARRHEA: 0
SEIZURES: 0
PALPITATIONS: 0
UNEXPECTED WEIGHT CHANGE: 0
BLOOD IN STOOL: 0
TROUBLE SWALLOWING: 0
FEVER: 0

## 2024-10-03 NOTE — PROGRESS NOTES
Subjective   Patient ID: Alyssa Jeffers is a 83 y.o. female who presents for Follow-up (refills).  HPI  Secondary polycythemia:  -followed by hematology  -had phlebotomy on 10/1- usually gets 1-2x/yr  -doesn't want to go qmth to infusion center     Hypertension:  -controlled     SOB only w/ going upgrade  -doesn't want further testing    Insomnia:  -usually very little amount of remeron prn     Review of Systems   Constitutional:  Negative for appetite change, fever and unexpected weight change.   HENT:  Negative for congestion and trouble swallowing.    Eyes:  Negative for visual disturbance.   Respiratory:  Negative for shortness of breath.    Cardiovascular:  Negative for chest pain, palpitations and leg swelling.   Gastrointestinal:  Negative for blood in stool, constipation and diarrhea.   Genitourinary:  Negative for difficulty urinating and hematuria.   Musculoskeletal:  Negative for gait problem and joint swelling.   Skin:  Negative for color change.   Allergic/Immunologic: Negative for immunocompromised state.   Neurological:  Negative for seizures and syncope.   Psychiatric/Behavioral:  Negative for confusion and suicidal ideas. The patient is not nervous/anxious.        Objective   /72   Pulse 68   Wt 81.1 kg (178 lb 12.8 oz)   SpO2 98%   BMI 29.40 kg/m²     Physical Exam  Constitutional:       General: She is not in acute distress.     Appearance: Normal appearance. She is not ill-appearing.   HENT:      Head: Normocephalic and atraumatic.      Right Ear: Tympanic membrane normal.      Left Ear: Tympanic membrane normal.      Nose: Nose normal.      Mouth/Throat:      Mouth: Mucous membranes are moist.   Eyes:      Pupils: Pupils are equal, round, and reactive to light.   Cardiovascular:      Rate and Rhythm: Normal rate and regular rhythm.      Heart sounds: No murmur heard.     No friction rub. No gallop.   Pulmonary:      Effort: Pulmonary effort is normal.      Breath sounds: Normal  breath sounds.   Abdominal:      General: Abdomen is flat. There is no distension.      Palpations: Abdomen is soft.      Tenderness: There is no abdominal tenderness. There is no guarding.      Hernia: No hernia is present.   Musculoskeletal:         General: Normal range of motion.   Skin:     General: Skin is warm and dry.   Neurological:      General: No focal deficit present.      Mental Status: She is alert. Mental status is at baseline.      Cranial Nerves: No cranial nerve deficit.      Motor: No weakness.      Gait: Gait normal.   Psychiatric:         Mood and Affect: Mood normal.         Behavior: Behavior normal.         Thought Content: Thought content normal.         Judgment: Judgment normal.         Assessment/Plan   Problem List Items Addressed This Visit       Adult hypothyroidism - Primary    Relevant Orders    TSH    Anxiety    Benign hypertension    Chronic kidney disease, stage 3 (Multi)    Polycythemia, secondary    Relevant Orders    CBC     Other Visit Diagnoses       Flu vaccine need        Relevant Orders    Flu vaccine, trivalent, preservative free, HIGH-DOSE, age 65y+ (Fluzone)    Screening for deficiency anemia        Relevant Orders    CBC          Assessment/Plan:    Hypothyroidism  Levo 100mg     Hypertension      norvasc 10mg   Hold losartan secondary to dizziness     Insomnia:  Remeron- very sensitive to it     Arrhythmia:   Secondary to PVC  Not heard today     GERD     CKD stage III   Improved on last blood test    Elev hct: secondary   followed by oncology  Phlebotomy prn - donates usually    Cbc q3mth (pt preference)    Right arm/shoulder pain: more bicep pain vs RTC, neg xray-US suggested that it RTC     hearing loss w/ tinnitus:  Suggest ent/audiology       [employer] hearing info given      SOB w/ exertion:   suspect 2/2 anemia (from blood draw) vs thyroid (last tsh controlled/wt stable/hr nml) vs cardiac (no cp, wt stable,+anemia) vs PE (nml hr, no risk factors)  Hb:  10.8  Improving feso4 bid w/ vit c decreased to daily  Improved w/ losartan but increased and pt would like to restart     Health maintenance:  -Medicare: 2025  -Preventative: 2025   -Patient's up-to-date on Pneumovax/prevnar, shingrix  -Patient refuses cholesterol medicine, mammogram,dexa  -refused colon ca screen  -lab next visit          Follow-up 6 months   Yes

## 2024-11-25 ENCOUNTER — OFFICE VISIT (OUTPATIENT)
Dept: PRIMARY CARE | Facility: CLINIC | Age: 84
End: 2024-11-25
Payer: MEDICARE

## 2024-11-25 VITALS
HEART RATE: 80 BPM | SYSTOLIC BLOOD PRESSURE: 138 MMHG | BODY MASS INDEX: 28.45 KG/M2 | HEIGHT: 66 IN | OXYGEN SATURATION: 99 % | DIASTOLIC BLOOD PRESSURE: 80 MMHG | WEIGHT: 177 LBS

## 2024-11-25 DIAGNOSIS — E03.9 ADULT HYPOTHYROIDISM: ICD-10-CM

## 2024-11-25 DIAGNOSIS — D75.1 POLYCYTHEMIA, SECONDARY: ICD-10-CM

## 2024-11-25 DIAGNOSIS — L30.4 INTERTRIGO: Primary | ICD-10-CM

## 2024-11-25 LAB
BASOPHILS # BLD AUTO: 0.08 X10*3/UL (ref 0–0.1)
BASOPHILS NFR BLD AUTO: 0.8 %
EOSINOPHIL # BLD AUTO: 0.03 X10*3/UL (ref 0–0.4)
EOSINOPHIL NFR BLD AUTO: 0.3 %
ERYTHROCYTE [DISTWIDTH] IN BLOOD BY AUTOMATED COUNT: 13.7 % (ref 11.5–14.5)
HCT VFR BLD AUTO: 52.3 % (ref 36–46)
HGB BLD-MCNC: 16.1 G/DL (ref 12–16)
IMM GRANULOCYTES # BLD AUTO: 0.03 X10*3/UL (ref 0–0.5)
IMM GRANULOCYTES NFR BLD AUTO: 0.3 % (ref 0–0.9)
LYMPHOCYTES # BLD AUTO: 1.3 X10*3/UL (ref 0.8–3)
LYMPHOCYTES NFR BLD AUTO: 13.6 %
MCH RBC QN AUTO: 27.1 PG (ref 26–34)
MCHC RBC AUTO-ENTMCNC: 30.8 G/DL (ref 32–36)
MCV RBC AUTO: 88 FL (ref 80–100)
MONOCYTES # BLD AUTO: 0.71 X10*3/UL (ref 0.05–0.8)
MONOCYTES NFR BLD AUTO: 7.5 %
NEUTROPHILS # BLD AUTO: 7.38 X10*3/UL (ref 1.6–5.5)
NEUTROPHILS NFR BLD AUTO: 77.5 %
NRBC BLD-RTO: 0 /100 WBCS (ref 0–0)
PLATELET # BLD AUTO: 377 X10*3/UL (ref 150–450)
RBC # BLD AUTO: 5.94 X10*6/UL (ref 4–5.2)
WBC # BLD AUTO: 9.5 X10*3/UL (ref 4.4–11.3)

## 2024-11-25 PROCEDURE — 99213 OFFICE O/P EST LOW 20 MIN: CPT | Performed by: FAMILY MEDICINE

## 2024-11-25 PROCEDURE — 1124F ACP DISCUSS-NO DSCNMKR DOCD: CPT | Performed by: FAMILY MEDICINE

## 2024-11-25 PROCEDURE — 1036F TOBACCO NON-USER: CPT | Performed by: FAMILY MEDICINE

## 2024-11-25 PROCEDURE — 3075F SYST BP GE 130 - 139MM HG: CPT | Performed by: FAMILY MEDICINE

## 2024-11-25 PROCEDURE — 3079F DIAST BP 80-89 MM HG: CPT | Performed by: FAMILY MEDICINE

## 2024-11-25 PROCEDURE — G2211 COMPLEX E/M VISIT ADD ON: HCPCS | Performed by: FAMILY MEDICINE

## 2024-11-25 PROCEDURE — 85025 COMPLETE CBC W/AUTO DIFF WBC: CPT

## 2024-11-25 PROCEDURE — 1159F MED LIST DOCD IN RCRD: CPT | Performed by: FAMILY MEDICINE

## 2024-11-25 RX ORDER — MUPIROCIN 20 MG/G
OINTMENT TOPICAL 3 TIMES DAILY
Qty: 22 G | Refills: 0 | Status: SHIPPED | OUTPATIENT
Start: 2024-11-25 | End: 2024-12-05

## 2024-11-25 RX ORDER — LEVOTHYROXINE SODIUM 100 UG/1
100 TABLET ORAL DAILY
Qty: 90 TABLET | Refills: 1 | Status: SHIPPED | OUTPATIENT
Start: 2024-11-25

## 2024-11-25 ASSESSMENT — ENCOUNTER SYMPTOMS: SHORTNESS OF BREATH: 1

## 2024-11-25 ASSESSMENT — PATIENT HEALTH QUESTIONNAIRE - PHQ9
SUM OF ALL RESPONSES TO PHQ9 QUESTIONS 1 AND 2: 0
1. LITTLE INTEREST OR PLEASURE IN DOING THINGS: NOT AT ALL
2. FEELING DOWN, DEPRESSED OR HOPELESS: NOT AT ALL

## 2024-11-25 NOTE — PROGRESS NOTES
"Subjective   Patient ID: Alyssa Jeffers is a 83 y.o. female who presents for Rash (Rash groin area for a a few months that is now bleeding ).  HPI  Secondary polycythemia:  -followed by hematology  -she had phlobotomy but now having more sob  -complains of sob frequently- seems to wax/wane  -worse w/ hills  -no othropnea/pnd    Rash:  -tried gold bond and cortisone for eczema- seemed to help  -arturo no help  -had some blood from the rash      Review of Systems   Respiratory:  Positive for shortness of breath.    Skin:  Positive for rash.       Objective   /80   Pulse 80   Ht 1.664 m (5' 5.5\")   Wt 80.3 kg (177 lb)   SpO2 99%   BMI 29.01 kg/m²     Physical Exam  Skin:     Findings: Rash (irriation at fold in the groin w/ mild erythema but no satiellite lesions) present.       Assessment/Plan   Problem List Items Addressed This Visit    None      Assessment/Plan:    Hypothyroidism  Levo 100mg     Hypertension      norvasc 10mg   Hold losartan secondary to dizziness     Insomnia:  Remeron- very sensitive to it     Arrhythmia:   Secondary to PVC  Not heard today     GERD     CKD stage III   Improved on last blood test    Elev hct: secondary   followed by oncology  Phlebotomy prn - donates usually    Cbc q3mth (pt preference)    Right arm/shoulder pain: more bicep pain vs RTC, neg xray-US suggested that it RTC     hearing loss w/ tinnitus:  Suggest ent/audiology       [place] hearing info given      SOB w/ exertion:   suspect 2/2 anemia (from blood draw) vs thyroid (last tsh controlled/wt stable/hr nml) vs cardiac (no cp, wt stable,+anemia) vs PE (nml hr, no risk factors)  Suggest CT chest and PFT- she refused     Rash in groin:  -suspect bacterial over fungal (no satellite lesions) vs contact  -Bactroban  -resistant to keflex 2/2 hx of c. Diff  -consider ketoconazole      Health maintenance:  -Medicare: 2025  -Preventative: 2025   -Patient's up-to-date on Pneumovax/prevnar, shingrix  -Patient refuses " cholesterol medicine, mammogram,dexa  -refused colon ca screen  -lab next visit          Follow-up 6 months

## 2024-12-11 ENCOUNTER — APPOINTMENT (OUTPATIENT)
Dept: PRIMARY CARE | Facility: CLINIC | Age: 84
End: 2024-12-11
Payer: MEDICARE

## 2024-12-11 DIAGNOSIS — D75.1 POLYCYTHEMIA, SECONDARY: ICD-10-CM

## 2024-12-11 DIAGNOSIS — Z13.0 SCREENING FOR DEFICIENCY ANEMIA: ICD-10-CM

## 2024-12-11 LAB
ERYTHROCYTE [DISTWIDTH] IN BLOOD BY AUTOMATED COUNT: 13.6 % (ref 11.5–14.5)
HCT VFR BLD AUTO: 48.3 % (ref 36–46)
HGB BLD-MCNC: 14.7 G/DL (ref 12–16)
MCH RBC QN AUTO: 26.8 PG (ref 26–34)
MCHC RBC AUTO-ENTMCNC: 30.4 G/DL (ref 32–36)
MCV RBC AUTO: 88 FL (ref 80–100)
NRBC BLD-RTO: 0 /100 WBCS (ref 0–0)
PLATELET # BLD AUTO: 332 X10*3/UL (ref 150–450)
RBC # BLD AUTO: 5.48 X10*6/UL (ref 4–5.2)
WBC # BLD AUTO: 8 X10*3/UL (ref 4.4–11.3)

## 2024-12-11 PROCEDURE — 85027 COMPLETE CBC AUTOMATED: CPT

## 2024-12-16 ENCOUNTER — OFFICE VISIT (OUTPATIENT)
Dept: HEMATOLOGY/ONCOLOGY | Facility: CLINIC | Age: 84
End: 2024-12-16
Payer: MEDICARE

## 2024-12-16 ENCOUNTER — INFUSION (OUTPATIENT)
Dept: HEMATOLOGY/ONCOLOGY | Facility: CLINIC | Age: 84
End: 2024-12-16
Payer: MEDICARE

## 2024-12-16 VITALS — HEART RATE: 89 BPM | RESPIRATION RATE: 18 BRPM | SYSTOLIC BLOOD PRESSURE: 145 MMHG | DIASTOLIC BLOOD PRESSURE: 80 MMHG

## 2024-12-16 VITALS
DIASTOLIC BLOOD PRESSURE: 84 MMHG | SYSTOLIC BLOOD PRESSURE: 159 MMHG | OXYGEN SATURATION: 95 % | BODY MASS INDEX: 29.14 KG/M2 | WEIGHT: 177.8 LBS | RESPIRATION RATE: 16 BRPM | TEMPERATURE: 97.2 F | HEART RATE: 122 BPM

## 2024-12-16 DIAGNOSIS — D75.1 POLYCYTHEMIA, SECONDARY: Primary | ICD-10-CM

## 2024-12-16 DIAGNOSIS — D75.1 POLYCYTHEMIA, SECONDARY: ICD-10-CM

## 2024-12-16 PROCEDURE — 82668 ASSAY OF ERYTHROPOIETIN: CPT

## 2024-12-16 PROCEDURE — 3077F SYST BP >= 140 MM HG: CPT | Performed by: INTERNAL MEDICINE

## 2024-12-16 PROCEDURE — 99214 OFFICE O/P EST MOD 30 MIN: CPT | Performed by: INTERNAL MEDICINE

## 2024-12-16 PROCEDURE — 1036F TOBACCO NON-USER: CPT | Performed by: INTERNAL MEDICINE

## 2024-12-16 PROCEDURE — 1126F AMNT PAIN NOTED NONE PRSNT: CPT | Performed by: INTERNAL MEDICINE

## 2024-12-16 PROCEDURE — 1159F MED LIST DOCD IN RCRD: CPT | Performed by: INTERNAL MEDICINE

## 2024-12-16 PROCEDURE — 99195 PHLEBOTOMY: CPT

## 2024-12-16 PROCEDURE — 3079F DIAST BP 80-89 MM HG: CPT | Performed by: INTERNAL MEDICINE

## 2024-12-16 RX ORDER — ALBUTEROL SULFATE 0.83 MG/ML
3 SOLUTION RESPIRATORY (INHALATION) AS NEEDED
Status: DISCONTINUED | OUTPATIENT
Start: 2024-12-16 | End: 2024-12-16 | Stop reason: HOSPADM

## 2024-12-16 RX ORDER — EPINEPHRINE 0.3 MG/.3ML
0.3 INJECTION SUBCUTANEOUS EVERY 5 MIN PRN
OUTPATIENT
Start: 2025-01-07

## 2024-12-16 RX ORDER — HEPARIN SODIUM,PORCINE/PF 10 UNIT/ML
50 SYRINGE (ML) INTRAVENOUS AS NEEDED
OUTPATIENT
Start: 2024-12-16

## 2024-12-16 RX ORDER — HEPARIN 100 UNIT/ML
500 SYRINGE INTRAVENOUS AS NEEDED
Status: DISCONTINUED | OUTPATIENT
Start: 2024-12-16 | End: 2024-12-16 | Stop reason: HOSPADM

## 2024-12-16 RX ORDER — DIPHENHYDRAMINE HYDROCHLORIDE 50 MG/ML
50 INJECTION INTRAMUSCULAR; INTRAVENOUS AS NEEDED
Status: DISCONTINUED | OUTPATIENT
Start: 2024-12-16 | End: 2024-12-16 | Stop reason: HOSPADM

## 2024-12-16 RX ORDER — DIPHENHYDRAMINE HYDROCHLORIDE 50 MG/ML
50 INJECTION INTRAMUSCULAR; INTRAVENOUS AS NEEDED
OUTPATIENT
Start: 2025-01-07

## 2024-12-16 RX ORDER — EPINEPHRINE 0.3 MG/.3ML
0.3 INJECTION SUBCUTANEOUS EVERY 5 MIN PRN
Status: DISCONTINUED | OUTPATIENT
Start: 2024-12-16 | End: 2024-12-16 | Stop reason: HOSPADM

## 2024-12-16 RX ORDER — HEPARIN 100 UNIT/ML
500 SYRINGE INTRAVENOUS AS NEEDED
OUTPATIENT
Start: 2024-12-16

## 2024-12-16 RX ORDER — FAMOTIDINE 10 MG/ML
20 INJECTION INTRAVENOUS ONCE AS NEEDED
Status: DISCONTINUED | OUTPATIENT
Start: 2024-12-16 | End: 2024-12-16 | Stop reason: HOSPADM

## 2024-12-16 RX ORDER — FAMOTIDINE 10 MG/ML
20 INJECTION INTRAVENOUS ONCE AS NEEDED
OUTPATIENT
Start: 2025-01-07

## 2024-12-16 RX ORDER — ALBUTEROL SULFATE 0.83 MG/ML
3 SOLUTION RESPIRATORY (INHALATION) AS NEEDED
OUTPATIENT
Start: 2025-01-07

## 2024-12-16 RX ORDER — HEPARIN SODIUM,PORCINE/PF 10 UNIT/ML
50 SYRINGE (ML) INTRAVENOUS AS NEEDED
Status: DISCONTINUED | OUTPATIENT
Start: 2024-12-16 | End: 2024-12-16 | Stop reason: HOSPADM

## 2024-12-16 SDOH — ECONOMIC STABILITY: FOOD INSECURITY: WITHIN THE PAST 12 MONTHS, THE FOOD YOU BOUGHT JUST DIDN'T LAST AND YOU DIDN'T HAVE MONEY TO GET MORE.: NEVER TRUE

## 2024-12-16 SDOH — ECONOMIC STABILITY: FOOD INSECURITY: WITHIN THE PAST 12 MONTHS, YOU WORRIED THAT YOUR FOOD WOULD RUN OUT BEFORE YOU GOT THE MONEY TO BUY MORE.: NEVER TRUE

## 2024-12-16 ASSESSMENT — ENCOUNTER SYMPTOMS
CONSTITUTIONAL NEGATIVE: 1
CARDIOVASCULAR NEGATIVE: 1
GASTROINTESTINAL NEGATIVE: 1
RESPIRATORY NEGATIVE: 1

## 2024-12-16 ASSESSMENT — PAIN SCALES - GENERAL: PAINLEVEL_OUTOF10: 0-NO PAIN

## 2024-12-16 NOTE — PROGRESS NOTES
Alyssa Jeffers phlebotomized for approximately 500mL using a calibrated scale. Patient drank 240 oz of water, patient felt lightheaded upon completion of phlebotomy, patient was monitored until symptoms resolved.  Patient  left ambulatory unit in  stable condition with her , follow up appointments scheduled and written copy given to patient.

## 2024-12-16 NOTE — PROGRESS NOTES
Patient ID: Alyssa Jeffers is a 84 y.o. female.  Referring Physician: Nan Mendenhall MD  [id number]  [place] [id number]  Primary Care Provider: Nico Salas DO  Visit Type:  {Visit Type:15241}     {Telehealth Consent:85061}    Subjective    HPI    Review of Systems - Oncology     Objective   BSA: There is no height or weight on file to calculate BSA.  There were no vitals taken for this visit.     has a past medical history of Encounter for screening for malignant neoplasm of colon (09/19/2017), Enterocolitis due to Clostridium difficile, not specified as recurrent, Other abnormality of red blood cells (04/05/2018), Personal history of retained foreign body fully removed (08/22/2017), and Sebaceous cyst (08/22/2017).   has a past surgical history that includes Hysterectomy (03/07/2017) and Knee arthroscopy w/ debridement (03/02/2020).  Family History   Problem Relation Name Age of Onset    Hypertension Mother      Parkinsonism Mother      Aneurysm Father       Oncology History    No history exists.       Alyssa Jeffers  reports that she has never smoked. She has never used smokeless tobacco.  She  reports no history of alcohol use.  She  reports no history of drug use.    Physical Exam    WBC   Date/Time Value Ref Range Status   12/11/2024 10:42 AM 8.0 4.4 - 11.3 x10*3/uL Final   11/25/2024 11:51 AM 9.5 4.4 - 11.3 x10*3/uL Final   10/01/2024 08:57 AM 7.9 4.4 - 11.3 x10*3/uL Final     nRBC   Date Value Ref Range Status   12/11/2024 0.0 0.0 - 0.0 /100 WBCs Final   11/25/2024 0.0 0.0 - 0.0 /100 WBCs Final   10/01/2024   Final     Comment:     Not Measured     RBC   Date Value Ref Range Status   12/11/2024 5.48 (H) 4.00 - 5.20 x10*6/uL Final   11/25/2024 5.94 (H) 4.00 - 5.20 x10*6/uL Final   10/01/2024 5.78 (H) 4.00 - 5.20 x10*6/uL Final     POC Hemoglobin   Date Value Ref Range Status   01/08/2024 12.6 12 - 16 g/dL Final   12/11/2023 10.8 (A) 12 - 16 g/dL Final     Hemoglobin  "  Date Value Ref Range Status   12/11/2024 14.7 12.0 - 16.0 g/dL Final   11/25/2024 16.1 (H) 12.0 - 16.0 g/dL Final   10/01/2024 16.5 (H) 12.0 - 16.0 g/dL Final     Hematocrit   Date Value Ref Range Status   12/11/2024 48.3 (H) 36.0 - 46.0 % Final   11/25/2024 52.3 (H) 36.0 - 46.0 % Final   10/01/2024 51.0 (H) 36.0 - 46.0 % Final     MCV   Date/Time Value Ref Range Status   12/11/2024 10:42 AM 88 80 - 100 fL Final   11/25/2024 11:51 AM 88 80 - 100 fL Final   10/01/2024 08:57 AM 88 80 - 100 fL Final     MCH   Date/Time Value Ref Range Status   12/11/2024 10:42 AM 26.8 26.0 - 34.0 pg Final   11/25/2024 11:51 AM 27.1 26.0 - 34.0 pg Final   10/01/2024 08:57 AM 28.5 26.0 - 34.0 pg Final     MCHC   Date/Time Value Ref Range Status   12/11/2024 10:42 AM 30.4 (L) 32.0 - 36.0 g/dL Final   11/25/2024 11:51 AM 30.8 (L) 32.0 - 36.0 g/dL Final   10/01/2024 08:57 AM 32.4 32.0 - 36.0 g/dL Final     RDW   Date/Time Value Ref Range Status   12/11/2024 10:42 AM 13.6 11.5 - 14.5 % Final   11/25/2024 11:51 AM 13.7 11.5 - 14.5 % Final   10/01/2024 08:57 AM 14.1 11.5 - 14.5 % Final     Platelets   Date/Time Value Ref Range Status   12/11/2024 10:42  150 - 450 x10*3/uL Final   11/25/2024 11:51  150 - 450 x10*3/uL Final   10/01/2024 08:57  150 - 450 x10*3/uL Final     No results found for: \"MPV\"  Neutrophils %   Date/Time Value Ref Range Status   11/25/2024 11:51 AM 77.5 40.0 - 80.0 % Final   10/01/2024 08:57 AM 56.8 40.0 - 80.0 % Final   08/30/2024 04:40 PM 61.8 40.0 - 80.0 % Final     Immature Granulocytes %, Automated   Date/Time Value Ref Range Status   11/25/2024 11:51 AM 0.3 0.0 - 0.9 % Final     Comment:     Immature Granulocyte Count (IG) includes promyelocytes, myelocytes and metamyelocytes but does not include bands. Percent differential counts (%) should be interpreted in the context of the absolute cell counts (cells/UL).   10/01/2024 08:57 AM 0.3 0.0 - 0.9 % Final     Comment:     Immature Granulocyte Count " (IG) includes promyelocytes, myelocytes and metamyelocytes but does not include bands. Percent differential counts (%) should be interpreted in the context of the absolute cell counts (cells/UL).   08/30/2024 04:40 PM 0.2 0.0 - 0.9 % Final     Comment:     Immature Granulocyte Count (IG) includes promyelocytes, myelocytes and metamyelocytes but does not include bands. Percent differential counts (%) should be interpreted in the context of the absolute cell counts (cells/UL).     Lymphocytes %   Date/Time Value Ref Range Status   11/25/2024 11:51 AM 13.6 13.0 - 44.0 % Final   10/01/2024 08:57 AM 31.1 13.0 - 44.0 % Final   08/30/2024 04:40 PM 26.9 13.0 - 44.0 % Final     Monocytes %   Date/Time Value Ref Range Status   11/25/2024 11:51 AM 7.5 2.0 - 10.0 % Final   10/01/2024 08:57 AM 8.7 2.0 - 10.0 % Final   08/30/2024 04:40 PM 8.3 2.0 - 10.0 % Final     Eosinophils %   Date/Time Value Ref Range Status   11/25/2024 11:51 AM 0.3 0.0 - 6.0 % Final   10/01/2024 08:57 AM 2.5 0.0 - 6.0 % Final   08/30/2024 04:40 PM 1.8 0.0 - 6.0 % Final     Basophils %   Date/Time Value Ref Range Status   11/25/2024 11:51 AM 0.8 0.0 - 2.0 % Final   10/01/2024 08:57 AM 0.6 0.0 - 2.0 % Final   08/30/2024 04:40 PM 1.0 0.0 - 2.0 % Final     Neutrophils Absolute   Date/Time Value Ref Range Status   11/25/2024 11:51 AM 7.38 (H) 1.60 - 5.50 x10*3/uL Final     Comment:     Percent differential counts (%) should be interpreted in the context of the absolute cell counts (cells/uL).   10/01/2024 08:57 AM 4.50 1.60 - 5.50 x10*3/uL Final     Comment:     Percent differential counts (%) should be interpreted in the context of the absolute cell counts (cells/uL).   08/30/2024 04:40 PM 5.85 (H) 1.60 - 5.50 x10*3/uL Final     Comment:     Percent differential counts (%) should be interpreted in the context of the absolute cell counts (cells/uL).     Immature Granulocytes Absolute, Automated   Date/Time Value Ref Range Status   11/25/2024 11:51 AM 0.03 0.00  "- 0.50 x10*3/uL Final   10/01/2024 08:57 AM 0.02 0.00 - 0.50 x10*3/uL Final   08/30/2024 04:40 PM 0.02 0.00 - 0.50 x10*3/uL Final     Lymphocytes Absolute   Date/Time Value Ref Range Status   11/25/2024 11:51 AM 1.30 0.80 - 3.00 x10*3/uL Final   10/01/2024 08:57 AM 2.46 0.80 - 3.00 x10*3/uL Final   08/30/2024 04:40 PM 2.54 0.80 - 3.00 x10*3/uL Final     Monocytes Absolute   Date/Time Value Ref Range Status   11/25/2024 11:51 AM 0.71 0.05 - 0.80 x10*3/uL Final   10/01/2024 08:57 AM 0.69 0.05 - 0.80 x10*3/uL Final   08/30/2024 04:40 PM 0.78 0.05 - 0.80 x10*3/uL Final     Eosinophils Absolute   Date/Time Value Ref Range Status   11/25/2024 11:51 AM 0.03 0.00 - 0.40 x10*3/uL Final   10/01/2024 08:57 AM 0.20 0.00 - 0.40 x10*3/uL Final   08/30/2024 04:40 PM 0.17 0.00 - 0.40 x10*3/uL Final     Basophils Absolute   Date/Time Value Ref Range Status   11/25/2024 11:51 AM 0.08 0.00 - 0.10 x10*3/uL Final   10/01/2024 08:57 AM 0.05 0.00 - 0.10 x10*3/uL Final     Comment:     Automated WBC differential has been confirmed by manual smear.   08/30/2024 04:40 PM 0.09 0.00 - 0.10 x10*3/uL Final       No components found for: \"PT\"  No results found for: \"APTT\"    Assessment/Plan           {Assess/PlanSmartLinks:85060}         INF 10 GEAUGA                         "

## 2024-12-16 NOTE — PATIENT INSTRUCTIONS
Today you met with your hematologist/oncologist.  Recent labs were discussed and questions answered.  Scheduling orders were placed.  While we appreciate that you verbalized understanding, if any questions arise after leaving, please do not hesitate to call the office to discuss.  [phone] Tamela Mendenhall will see you in 3 months with your next phlebotomy.

## 2024-12-16 NOTE — PROGRESS NOTES
Patient ID: Alyssa Jeffers is a 84 y.o. female.  Referring Physician: No referring provider defined for this encounter.  Primary Care Provider: Nico Salas DO  Visit Type:  Follow Up     Subjective    HPI  Referred for Polycythemia: VERITO 2 Negative.  pt is a pleasant 79 year old  female who was referred by Dr. Salas for evaluation and management of polycythemia     labs obtained on 10/2/2019 revealed elevated rbc 5.31 and elevated hct 49.8 with normal hgb, wbc, and plt count. repeat labs obtained on 10/14/2019 revealed elevated hgb 16.3 and elevated hct 51.7. erythropoietin level normal. Jak2 not detected.  Review of Systems   Constitutional: Negative.    HENT:  Negative.     Respiratory: Negative.     Cardiovascular: Negative.    Gastrointestinal: Negative.         Objective   BSA: 1.93 meters squared  /84 (BP Location: Left arm, Patient Position: Sitting, BP Cuff Size: Large adult)   Pulse (!) 122   Temp 36.2 °C (97.2 °F) (Temporal)   Resp 16   Wt 80.7 kg (177 lb 12.8 oz)   SpO2 95%   BMI 29.14 kg/m²      has a past medical history of Encounter for screening for malignant neoplasm of colon (09/19/2017), Enterocolitis due to Clostridium difficile, not specified as recurrent, Other abnormality of red blood cells (04/05/2018), Personal history of retained foreign body fully removed (08/22/2017), and Sebaceous cyst (08/22/2017).   has a past surgical history that includes Hysterectomy (03/07/2017) and Knee arthroscopy w/ debridement (03/02/2020).  Family History   Problem Relation Name Age of Onset    Hypertension Mother      Parkinsonism Mother      Aneurysm Father       Oncology History    No history exists.       Alyssa Jeffers  reports that she has never smoked. She has never used smokeless tobacco.  She  reports no history of alcohol use.  She  reports no history of drug use.    Physical Exam  Constitutional:       Appearance: Normal appearance.   HENT:      Head: Normocephalic  and atraumatic.   Eyes:      Extraocular Movements: Extraocular movements intact.      Pupils: Pupils are equal, round, and reactive to light.   Neurological:      Mental Status: She is alert.         WBC   Date/Time Value Ref Range Status   12/11/2024 10:42 AM 8.0 4.4 - 11.3 x10*3/uL Final   11/25/2024 11:51 AM 9.5 4.4 - 11.3 x10*3/uL Final   10/01/2024 08:57 AM 7.9 4.4 - 11.3 x10*3/uL Final     nRBC   Date Value Ref Range Status   12/11/2024 0.0 0.0 - 0.0 /100 WBCs Final   11/25/2024 0.0 0.0 - 0.0 /100 WBCs Final   10/01/2024   Final     Comment:     Not Measured     RBC   Date Value Ref Range Status   12/11/2024 5.48 (H) 4.00 - 5.20 x10*6/uL Final   11/25/2024 5.94 (H) 4.00 - 5.20 x10*6/uL Final   10/01/2024 5.78 (H) 4.00 - 5.20 x10*6/uL Final     POC Hemoglobin   Date Value Ref Range Status   01/08/2024 12.6 12 - 16 g/dL Final   12/11/2023 10.8 (A) 12 - 16 g/dL Final     Hemoglobin   Date Value Ref Range Status   12/11/2024 14.7 12.0 - 16.0 g/dL Final   11/25/2024 16.1 (H) 12.0 - 16.0 g/dL Final   10/01/2024 16.5 (H) 12.0 - 16.0 g/dL Final     Hematocrit   Date Value Ref Range Status   12/11/2024 48.3 (H) 36.0 - 46.0 % Final   11/25/2024 52.3 (H) 36.0 - 46.0 % Final   10/01/2024 51.0 (H) 36.0 - 46.0 % Final     MCV   Date/Time Value Ref Range Status   12/11/2024 10:42 AM 88 80 - 100 fL Final   11/25/2024 11:51 AM 88 80 - 100 fL Final   10/01/2024 08:57 AM 88 80 - 100 fL Final     MCH   Date/Time Value Ref Range Status   12/11/2024 10:42 AM 26.8 26.0 - 34.0 pg Final   11/25/2024 11:51 AM 27.1 26.0 - 34.0 pg Final   10/01/2024 08:57 AM 28.5 26.0 - 34.0 pg Final     MCHC   Date/Time Value Ref Range Status   12/11/2024 10:42 AM 30.4 (L) 32.0 - 36.0 g/dL Final   11/25/2024 11:51 AM 30.8 (L) 32.0 - 36.0 g/dL Final   10/01/2024 08:57 AM 32.4 32.0 - 36.0 g/dL Final     RDW   Date/Time Value Ref Range Status   12/11/2024 10:42 AM 13.6 11.5 - 14.5 % Final   11/25/2024 11:51 AM 13.7 11.5 - 14.5 % Final   10/01/2024 08:57  "AM 14.1 11.5 - 14.5 % Final     Platelets   Date/Time Value Ref Range Status   12/11/2024 10:42  150 - 450 x10*3/uL Final   11/25/2024 11:51  150 - 450 x10*3/uL Final   10/01/2024 08:57  150 - 450 x10*3/uL Final     No results found for: \"MPV\"  Neutrophils %   Date/Time Value Ref Range Status   11/25/2024 11:51 AM 77.5 40.0 - 80.0 % Final   10/01/2024 08:57 AM 56.8 40.0 - 80.0 % Final   08/30/2024 04:40 PM 61.8 40.0 - 80.0 % Final     Immature Granulocytes %, Automated   Date/Time Value Ref Range Status   11/25/2024 11:51 AM 0.3 0.0 - 0.9 % Final     Comment:     Immature Granulocyte Count (IG) includes promyelocytes, myelocytes and metamyelocytes but does not include bands. Percent differential counts (%) should be interpreted in the context of the absolute cell counts (cells/UL).   10/01/2024 08:57 AM 0.3 0.0 - 0.9 % Final     Comment:     Immature Granulocyte Count (IG) includes promyelocytes, myelocytes and metamyelocytes but does not include bands. Percent differential counts (%) should be interpreted in the context of the absolute cell counts (cells/UL).   08/30/2024 04:40 PM 0.2 0.0 - 0.9 % Final     Comment:     Immature Granulocyte Count (IG) includes promyelocytes, myelocytes and metamyelocytes but does not include bands. Percent differential counts (%) should be interpreted in the context of the absolute cell counts (cells/UL).     Lymphocytes %   Date/Time Value Ref Range Status   11/25/2024 11:51 AM 13.6 13.0 - 44.0 % Final   10/01/2024 08:57 AM 31.1 13.0 - 44.0 % Final   08/30/2024 04:40 PM 26.9 13.0 - 44.0 % Final     Monocytes %   Date/Time Value Ref Range Status   11/25/2024 11:51 AM 7.5 2.0 - 10.0 % Final   10/01/2024 08:57 AM 8.7 2.0 - 10.0 % Final   08/30/2024 04:40 PM 8.3 2.0 - 10.0 % Final     Eosinophils %   Date/Time Value Ref Range Status   11/25/2024 11:51 AM 0.3 0.0 - 6.0 % Final   10/01/2024 08:57 AM 2.5 0.0 - 6.0 % Final   08/30/2024 04:40 PM 1.8 0.0 - 6.0 % Final "     Basophils %   Date/Time Value Ref Range Status   11/25/2024 11:51 AM 0.8 0.0 - 2.0 % Final   10/01/2024 08:57 AM 0.6 0.0 - 2.0 % Final   08/30/2024 04:40 PM 1.0 0.0 - 2.0 % Final     Neutrophils Absolute   Date/Time Value Ref Range Status   11/25/2024 11:51 AM 7.38 (H) 1.60 - 5.50 x10*3/uL Final     Comment:     Percent differential counts (%) should be interpreted in the context of the absolute cell counts (cells/uL).   10/01/2024 08:57 AM 4.50 1.60 - 5.50 x10*3/uL Final     Comment:     Percent differential counts (%) should be interpreted in the context of the absolute cell counts (cells/uL).   08/30/2024 04:40 PM 5.85 (H) 1.60 - 5.50 x10*3/uL Final     Comment:     Percent differential counts (%) should be interpreted in the context of the absolute cell counts (cells/uL).     Immature Granulocytes Absolute, Automated   Date/Time Value Ref Range Status   11/25/2024 11:51 AM 0.03 0.00 - 0.50 x10*3/uL Final   10/01/2024 08:57 AM 0.02 0.00 - 0.50 x10*3/uL Final   08/30/2024 04:40 PM 0.02 0.00 - 0.50 x10*3/uL Final     Lymphocytes Absolute   Date/Time Value Ref Range Status   11/25/2024 11:51 AM 1.30 0.80 - 3.00 x10*3/uL Final   10/01/2024 08:57 AM 2.46 0.80 - 3.00 x10*3/uL Final   08/30/2024 04:40 PM 2.54 0.80 - 3.00 x10*3/uL Final     Monocytes Absolute   Date/Time Value Ref Range Status   11/25/2024 11:51 AM 0.71 0.05 - 0.80 x10*3/uL Final   10/01/2024 08:57 AM 0.69 0.05 - 0.80 x10*3/uL Final   08/30/2024 04:40 PM 0.78 0.05 - 0.80 x10*3/uL Final     Eosinophils Absolute   Date/Time Value Ref Range Status   11/25/2024 11:51 AM 0.03 0.00 - 0.40 x10*3/uL Final   10/01/2024 08:57 AM 0.20 0.00 - 0.40 x10*3/uL Final   08/30/2024 04:40 PM 0.17 0.00 - 0.40 x10*3/uL Final     Basophils Absolute   Date/Time Value Ref Range Status   11/25/2024 11:51 AM 0.08 0.00 - 0.10 x10*3/uL Final   10/01/2024 08:57 AM 0.05 0.00 - 0.10 x10*3/uL Final     Comment:     Automated WBC differential has been confirmed by manual smear.    08/30/2024 04:40 PM 0.09 0.00 - 0.10 x10*3/uL Final       Assessment/Plan      80 year old  female who presents with secondary polycythemia, likely secondary:   RX Phlebotomy: Q 3 monthly    12/16/24: Fror phlebotomy RTC 3 months     Diagnoses and all orders for this visit:  Polycythemia, secondary  -     Erythropoietin; Future  -     Infusion Appointment Request; Future  -     Clinic Appointment Request Follow Up (review labs, possible phleb); Future  -     Infusion Appointment Request; Future           Nan Mendenhall MD

## 2024-12-18 LAB — EPO SERPL-ACNC: 21 MU/ML (ref 4–27)

## 2025-01-03 ENCOUNTER — APPOINTMENT (OUTPATIENT)
Dept: PRIMARY CARE | Facility: CLINIC | Age: 85
End: 2025-01-03
Payer: MEDICARE

## 2025-01-07 ENCOUNTER — APPOINTMENT (OUTPATIENT)
Dept: HEMATOLOGY/ONCOLOGY | Facility: CLINIC | Age: 85
End: 2025-01-07
Payer: MEDICARE

## 2025-01-10 ENCOUNTER — APPOINTMENT (OUTPATIENT)
Dept: PRIMARY CARE | Facility: CLINIC | Age: 85
End: 2025-01-10
Payer: MEDICARE

## 2025-01-17 ENCOUNTER — APPOINTMENT (OUTPATIENT)
Dept: PRIMARY CARE | Facility: CLINIC | Age: 85
End: 2025-01-17
Payer: MEDICARE

## 2025-01-17 DIAGNOSIS — D75.1 POLYCYTHEMIA, SECONDARY: ICD-10-CM

## 2025-01-17 LAB
ANION GAP SERPL CALC-SCNC: 12 MMOL/L (ref 10–20)
BASOPHILS # BLD AUTO: 0.09 X10*3/UL (ref 0–0.1)
BASOPHILS NFR BLD AUTO: 1.3 %
BUN SERPL-MCNC: 13 MG/DL (ref 6–23)
CALCIUM SERPL-MCNC: 8.6 MG/DL (ref 8.6–10.3)
CHLORIDE SERPL-SCNC: 104 MMOL/L (ref 98–107)
CO2 SERPL-SCNC: 28 MMOL/L (ref 21–32)
CREAT SERPL-MCNC: 0.97 MG/DL (ref 0.5–1.05)
EGFRCR SERPLBLD CKD-EPI 2021: 58 ML/MIN/1.73M*2
EOSINOPHIL # BLD AUTO: 0.22 X10*3/UL (ref 0–0.4)
EOSINOPHIL NFR BLD AUTO: 3.1 %
ERYTHROCYTE [DISTWIDTH] IN BLOOD BY AUTOMATED COUNT: 14 % (ref 11.5–14.5)
FERRITIN SERPL-MCNC: 17 NG/ML (ref 8–150)
GLUCOSE SERPL-MCNC: 106 MG/DL (ref 74–99)
HCT VFR BLD AUTO: 43.2 % (ref 36–46)
HGB BLD-MCNC: 13.3 G/DL (ref 12–16)
IMM GRANULOCYTES # BLD AUTO: 0.02 X10*3/UL (ref 0–0.5)
IMM GRANULOCYTES NFR BLD AUTO: 0.3 % (ref 0–0.9)
IRON SATN MFR SERPL: 5 % (ref 25–45)
IRON SERPL-MCNC: 20 UG/DL (ref 35–150)
LYMPHOCYTES # BLD AUTO: 2.01 X10*3/UL (ref 0.8–3)
LYMPHOCYTES NFR BLD AUTO: 28.6 %
MCH RBC QN AUTO: 25.7 PG (ref 26–34)
MCHC RBC AUTO-ENTMCNC: 30.8 G/DL (ref 32–36)
MCV RBC AUTO: 83 FL (ref 80–100)
MONOCYTES # BLD AUTO: 0.71 X10*3/UL (ref 0.05–0.8)
MONOCYTES NFR BLD AUTO: 10.1 %
NEUTROPHILS # BLD AUTO: 3.97 X10*3/UL (ref 1.6–5.5)
NEUTROPHILS NFR BLD AUTO: 56.6 %
NRBC BLD-RTO: 0 /100 WBCS (ref 0–0)
PLATELET # BLD AUTO: 370 X10*3/UL (ref 150–450)
POTASSIUM SERPL-SCNC: 4.4 MMOL/L (ref 3.5–5.3)
RBC # BLD AUTO: 5.18 X10*6/UL (ref 4–5.2)
SODIUM SERPL-SCNC: 140 MMOL/L (ref 136–145)
TIBC SERPL-MCNC: 428 UG/DL (ref 240–445)
UIBC SERPL-MCNC: 408 UG/DL (ref 110–370)
WBC # BLD AUTO: 7 X10*3/UL (ref 4.4–11.3)

## 2025-01-17 PROCEDURE — 80048 BASIC METABOLIC PNL TOTAL CA: CPT

## 2025-01-17 PROCEDURE — 85025 COMPLETE CBC W/AUTO DIFF WBC: CPT

## 2025-01-17 PROCEDURE — 83550 IRON BINDING TEST: CPT

## 2025-01-17 PROCEDURE — 83540 ASSAY OF IRON: CPT

## 2025-01-17 PROCEDURE — 82728 ASSAY OF FERRITIN: CPT

## 2025-02-08 DIAGNOSIS — K21.9 GASTROESOPHAGEAL REFLUX DISEASE WITHOUT ESOPHAGITIS: ICD-10-CM

## 2025-02-10 RX ORDER — OMEPRAZOLE 20 MG/1
CAPSULE, DELAYED RELEASE ORAL
Qty: 90 CAPSULE | Refills: 3 | Status: SHIPPED | OUTPATIENT
Start: 2025-02-10

## 2025-02-14 ENCOUNTER — TELEPHONE (OUTPATIENT)
Dept: PRIMARY CARE | Facility: CLINIC | Age: 85
End: 2025-02-14
Payer: MEDICARE

## 2025-02-24 ENCOUNTER — TELEPHONE (OUTPATIENT)
Dept: HEMATOLOGY/ONCOLOGY | Facility: CLINIC | Age: 85
End: 2025-02-24
Payer: MEDICARE

## 2025-02-28 ENCOUNTER — APPOINTMENT (OUTPATIENT)
Dept: HEMATOLOGY/ONCOLOGY | Facility: CLINIC | Age: 85
End: 2025-02-28
Payer: MEDICARE

## 2025-03-12 ENCOUNTER — APPOINTMENT (OUTPATIENT)
Dept: PRIMARY CARE | Facility: CLINIC | Age: 85
End: 2025-03-12
Payer: MEDICARE

## 2025-03-13 LAB
BASOPHILS # BLD AUTO: 77 CELLS/UL (ref 0–200)
BASOPHILS NFR BLD AUTO: 0.9 %
EOSINOPHIL # BLD AUTO: 198 CELLS/UL (ref 15–500)
EOSINOPHIL NFR BLD AUTO: 2.3 %
ERYTHROCYTE [DISTWIDTH] IN BLOOD BY AUTOMATED COUNT: 15.4 % (ref 11–15)
HCT VFR BLD AUTO: 44.7 % (ref 35–45)
HGB BLD-MCNC: 13.6 G/DL (ref 11.7–15.5)
LYMPHOCYTES # BLD AUTO: 2597 CELLS/UL (ref 850–3900)
LYMPHOCYTES NFR BLD AUTO: 30.2 %
MCH RBC QN AUTO: 24.1 PG (ref 27–33)
MCHC RBC AUTO-ENTMCNC: 30.4 G/DL (ref 32–36)
MCV RBC AUTO: 79.1 FL (ref 80–100)
MONOCYTES # BLD AUTO: 731 CELLS/UL (ref 200–950)
MONOCYTES NFR BLD AUTO: 8.5 %
NEUTROPHILS # BLD AUTO: 4997 CELLS/UL (ref 1500–7800)
NEUTROPHILS NFR BLD AUTO: 58.1 %
PLATELET # BLD AUTO: 418 THOUSAND/UL (ref 140–400)
PMV BLD REES-ECKER: 10.7 FL (ref 7.5–12.5)
RBC # BLD AUTO: 5.65 MILLION/UL (ref 3.8–5.1)
WBC # BLD AUTO: 8.6 THOUSAND/UL (ref 3.8–10.8)

## 2025-03-17 ENCOUNTER — INFUSION (OUTPATIENT)
Dept: HEMATOLOGY/ONCOLOGY | Facility: CLINIC | Age: 85
End: 2025-03-17
Payer: MEDICARE

## 2025-03-17 ENCOUNTER — APPOINTMENT (OUTPATIENT)
Dept: HEMATOLOGY/ONCOLOGY | Facility: CLINIC | Age: 85
End: 2025-03-17
Payer: MEDICARE

## 2025-03-17 VITALS
BODY MASS INDEX: 29.41 KG/M2 | TEMPERATURE: 96.8 F | HEART RATE: 112 BPM | WEIGHT: 179.45 LBS | RESPIRATION RATE: 18 BRPM | DIASTOLIC BLOOD PRESSURE: 76 MMHG | OXYGEN SATURATION: 92 % | SYSTOLIC BLOOD PRESSURE: 137 MMHG

## 2025-03-17 DIAGNOSIS — D75.1 POLYCYTHEMIA, SECONDARY: ICD-10-CM

## 2025-03-17 LAB
ANION GAP SERPL CALC-SCNC: 12 MMOL/L (ref 10–20)
BASOPHILS # BLD AUTO: 0.08 X10*3/UL (ref 0–0.1)
BASOPHILS NFR BLD AUTO: 1 %
BUN SERPL-MCNC: 17 MG/DL (ref 6–23)
CALCIUM SERPL-MCNC: 8.2 MG/DL (ref 8.6–10.3)
CHLORIDE SERPL-SCNC: 103 MMOL/L (ref 98–107)
CO2 SERPL-SCNC: 24 MMOL/L (ref 21–32)
CREAT SERPL-MCNC: 1.07 MG/DL (ref 0.5–1.05)
EGFRCR SERPLBLD CKD-EPI 2021: 52 ML/MIN/1.73M*2
EOSINOPHIL # BLD AUTO: 0.24 X10*3/UL (ref 0–0.4)
EOSINOPHIL NFR BLD AUTO: 2.9 %
ERYTHROCYTE [DISTWIDTH] IN BLOOD BY AUTOMATED COUNT: 15.8 % (ref 11.5–14.5)
FERRITIN SERPL-MCNC: 18 NG/ML (ref 8–150)
GLUCOSE SERPL-MCNC: 110 MG/DL (ref 74–99)
HCT VFR BLD AUTO: 40 % (ref 36–46)
HGB BLD-MCNC: 12.3 G/DL (ref 12–16)
IMM GRANULOCYTES # BLD AUTO: 0.01 X10*3/UL (ref 0–0.5)
IMM GRANULOCYTES NFR BLD AUTO: 0.1 % (ref 0–0.9)
IRON SATN MFR SERPL: 7 % (ref 25–45)
IRON SERPL-MCNC: 28 UG/DL (ref 35–150)
LYMPHOCYTES # BLD AUTO: 2.19 X10*3/UL (ref 0.8–3)
LYMPHOCYTES NFR BLD AUTO: 26 %
MCH RBC QN AUTO: 23.8 PG (ref 26–34)
MCHC RBC AUTO-ENTMCNC: 30.8 G/DL (ref 32–36)
MCV RBC AUTO: 78 FL (ref 80–100)
MONOCYTES # BLD AUTO: 0.94 X10*3/UL (ref 0.05–0.8)
MONOCYTES NFR BLD AUTO: 11.2 %
NEUTROPHILS # BLD AUTO: 4.96 X10*3/UL (ref 1.6–5.5)
NEUTROPHILS NFR BLD AUTO: 58.8 %
PLATELET # BLD AUTO: 359 X10*3/UL (ref 150–450)
POTASSIUM SERPL-SCNC: 4.2 MMOL/L (ref 3.5–5.3)
RBC # BLD AUTO: 5.16 X10*6/UL (ref 4–5.2)
SODIUM SERPL-SCNC: 135 MMOL/L (ref 136–145)
TIBC SERPL-MCNC: 376 UG/DL (ref 240–445)
UIBC SERPL-MCNC: 348 UG/DL (ref 110–370)
WBC # BLD AUTO: 8.4 X10*3/UL (ref 4.4–11.3)

## 2025-03-17 PROCEDURE — 85025 COMPLETE CBC W/AUTO DIFF WBC: CPT

## 2025-03-17 PROCEDURE — 80048 BASIC METABOLIC PNL TOTAL CA: CPT

## 2025-03-17 PROCEDURE — 83540 ASSAY OF IRON: CPT

## 2025-03-17 PROCEDURE — 82728 ASSAY OF FERRITIN: CPT

## 2025-03-17 RX ORDER — ALBUTEROL SULFATE 0.83 MG/ML
3 SOLUTION RESPIRATORY (INHALATION) AS NEEDED
OUTPATIENT
Start: 2025-04-01

## 2025-03-17 RX ORDER — HEPARIN 100 UNIT/ML
500 SYRINGE INTRAVENOUS AS NEEDED
OUTPATIENT
Start: 2025-03-17

## 2025-03-17 RX ORDER — HEPARIN SODIUM,PORCINE/PF 10 UNIT/ML
50 SYRINGE (ML) INTRAVENOUS AS NEEDED
OUTPATIENT
Start: 2025-03-17

## 2025-03-17 RX ORDER — DIPHENHYDRAMINE HYDROCHLORIDE 50 MG/ML
50 INJECTION, SOLUTION INTRAMUSCULAR; INTRAVENOUS AS NEEDED
OUTPATIENT
Start: 2025-04-01

## 2025-03-17 RX ORDER — EPINEPHRINE 0.3 MG/.3ML
0.3 INJECTION SUBCUTANEOUS EVERY 5 MIN PRN
OUTPATIENT
Start: 2025-04-01

## 2025-03-17 RX ORDER — FAMOTIDINE 10 MG/ML
20 INJECTION, SOLUTION INTRAVENOUS ONCE AS NEEDED
OUTPATIENT
Start: 2025-04-01

## 2025-03-17 ASSESSMENT — PAIN SCALES - GENERAL: PAINLEVEL_OUTOF10: 0-NO PAIN

## 2025-03-17 NOTE — PROGRESS NOTES
Alyssa Jeffers self ambulated to [facility] for possible phlebotomy.  Pt did not meet parameter to treat. Gait steady upon DC home.  Alyssa Jeffers denies further questions/concerns/comments and agrees to POC going forward.

## 2025-03-28 ENCOUNTER — CLINICAL SUPPORT (OUTPATIENT)
Dept: PRIMARY CARE | Facility: CLINIC | Age: 85
End: 2025-03-28
Payer: MEDICARE

## 2025-03-28 PROCEDURE — 90471 IMMUNIZATION ADMIN: CPT

## 2025-03-28 PROCEDURE — 90715 TDAP VACCINE 7 YRS/> IM: CPT

## 2025-03-28 NOTE — PROGRESS NOTES
Pt wants TDAP. 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 Told her she does not need till next yr but adamant so will be given today

## 2025-03-28 NOTE — PROGRESS NOTES
Pt wants TD boosted, due next year but adamant about having it this year.  Works around animals and dirty soil often.

## 2025-04-03 ENCOUNTER — APPOINTMENT (OUTPATIENT)
Dept: PRIMARY CARE | Facility: CLINIC | Age: 85
End: 2025-04-03
Payer: MEDICARE

## 2025-04-07 ENCOUNTER — OFFICE VISIT (OUTPATIENT)
Dept: HEMATOLOGY/ONCOLOGY | Facility: CLINIC | Age: 85
End: 2025-04-07
Payer: MEDICARE

## 2025-04-07 VITALS
SYSTOLIC BLOOD PRESSURE: 130 MMHG | TEMPERATURE: 97.9 F | RESPIRATION RATE: 17 BRPM | DIASTOLIC BLOOD PRESSURE: 73 MMHG | OXYGEN SATURATION: 96 % | WEIGHT: 177.8 LBS | BODY MASS INDEX: 29.14 KG/M2 | HEART RATE: 105 BPM

## 2025-04-07 DIAGNOSIS — D75.1 POLYCYTHEMIA, SECONDARY: Primary | ICD-10-CM

## 2025-04-07 PROCEDURE — 3075F SYST BP GE 130 - 139MM HG: CPT | Performed by: INTERNAL MEDICINE

## 2025-04-07 PROCEDURE — 1159F MED LIST DOCD IN RCRD: CPT | Performed by: INTERNAL MEDICINE

## 2025-04-07 PROCEDURE — 3078F DIAST BP <80 MM HG: CPT | Performed by: INTERNAL MEDICINE

## 2025-04-07 PROCEDURE — 99214 OFFICE O/P EST MOD 30 MIN: CPT | Performed by: INTERNAL MEDICINE

## 2025-04-07 PROCEDURE — 1125F AMNT PAIN NOTED PAIN PRSNT: CPT | Performed by: INTERNAL MEDICINE

## 2025-04-07 RX ORDER — FAMOTIDINE 10 MG/ML
20 INJECTION, SOLUTION INTRAVENOUS ONCE AS NEEDED
OUTPATIENT
Start: 2025-07-01

## 2025-04-07 RX ORDER — ALBUTEROL SULFATE 0.83 MG/ML
3 SOLUTION RESPIRATORY (INHALATION) AS NEEDED
OUTPATIENT
Start: 2025-07-01

## 2025-04-07 RX ORDER — EPINEPHRINE 0.3 MG/.3ML
0.3 INJECTION SUBCUTANEOUS EVERY 5 MIN PRN
OUTPATIENT
Start: 2025-07-01

## 2025-04-07 RX ORDER — DIPHENHYDRAMINE HYDROCHLORIDE 50 MG/ML
50 INJECTION, SOLUTION INTRAMUSCULAR; INTRAVENOUS AS NEEDED
OUTPATIENT
Start: 2025-07-01

## 2025-04-07 ASSESSMENT — PAIN SCALES - GENERAL: PAINLEVEL_OUTOF10: 1

## 2025-04-07 NOTE — PROGRESS NOTES
Patient ID: Alyssa Jeffers is a 84 y.o. female.  Referring Physician: Nan Mendenhall MD  [id number] [place] [address]  Primary Care Provider: Arin Dong MD  Visit Type:  Follow Up     Subjective    HPI  Referred for Polycythemia: VERITO 2 Negative.  pt is a pleasant 79 year old  female who was referred by Dr. Salas for evaluation and management of polycythemia     labs obtained on 10/2/2019 revealed elevated rbc 5.31 and elevated hct 49.8 with normal hgb, wbc, and plt count. repeat labs obtained on 10/14/2019 revealed elevated hgb 16.3 and elevated hct 51.7. erythropoietin level normal. Jak2 not detected.  Review of Systems   Constitutional: Negative.    HENT:  Negative.     Respiratory: Negative.     Cardiovascular: Negative.    Gastrointestinal: Negative.         Objective   BSA: There is no height or weight on file to calculate BSA.  There were no vitals taken for this visit.     has a past medical history of Encounter for screening for malignant neoplasm of colon (09/19/2017), Enterocolitis due to Clostridium difficile, not specified as recurrent, Other abnormality of red blood cells (04/05/2018), Personal history of retained foreign body fully removed (08/22/2017), and Sebaceous cyst (08/22/2017).   has a past surgical history that includes Hysterectomy (03/07/2017) and Knee arthroscopy w/ debridement (03/02/2020).  Family History   Problem Relation Name Age of Onset    Hypertension Mother      Parkinsonism Mother      Aneurysm Father       Oncology History    No history exists.       Alyssa Jeffers  reports that she has never smoked. She has never used smokeless tobacco.  She  reports no history of alcohol use.  She  reports no history of drug use.    Physical Exam  Constitutional:       Appearance: Normal appearance.   HENT:      Head: Normocephalic and atraumatic.   Eyes:      Extraocular Movements: Extraocular movements intact.      Pupils: Pupils  are equal, round, and reactive to light.   Neurological:      Mental Status: She is alert.         WBC   Date/Time Value Ref Range Status   03/17/2025 09:03 AM 8.4 4.4 - 11.3 x10*3/uL Final   01/17/2025 11:24 AM 7.0 4.4 - 11.3 x10*3/uL Final   12/11/2024 10:42 AM 8.0 4.4 - 11.3 x10*3/uL Final     WHITE BLOOD CELL COUNT   Date/Time Value Ref Range Status   03/12/2025 10:52 AM 8.6 3.8 - 10.8 Thousand/uL Final     nRBC   Date Value Ref Range Status   01/17/2025 0.0 0.0 - 0.0 /100 WBCs Final   12/11/2024 0.0 0.0 - 0.0 /100 WBCs Final   11/25/2024 0.0 0.0 - 0.0 /100 WBCs Final     RBC   Date Value Ref Range Status   03/17/2025 5.16 4.00 - 5.20 x10*6/uL Final   01/17/2025 5.18 4.00 - 5.20 x10*6/uL Final   12/11/2024 5.48 (H) 4.00 - 5.20 x10*6/uL Final     RED BLOOD CELL COUNT   Date Value Ref Range Status   03/12/2025 5.65 (H) 3.80 - 5.10 Million/uL Final     POC Hemoglobin   Date Value Ref Range Status   01/08/2024 12.6 12 - 16 g/dL Final   12/11/2023 10.8 (A) 12 - 16 g/dL Final     Hemoglobin   Date Value Ref Range Status   03/17/2025 12.3 12.0 - 16.0 g/dL Final   01/17/2025 13.3 12.0 - 16.0 g/dL Final   12/11/2024 14.7 12.0 - 16.0 g/dL Final     HEMOGLOBIN   Date Value Ref Range Status   03/12/2025 13.6 11.7 - 15.5 g/dL Final     Hematocrit   Date Value Ref Range Status   03/17/2025 40.0 36.0 - 46.0 % Final   01/17/2025 43.2 36.0 - 46.0 % Final   12/11/2024 48.3 (H) 36.0 - 46.0 % Final     HEMATOCRIT   Date Value Ref Range Status   03/12/2025 44.7 35.0 - 45.0 % Final     MCV   Date/Time Value Ref Range Status   03/17/2025 09:03 AM 78 (L) 80 - 100 fL Final   03/12/2025 10:52 AM 79.1 (L) 80.0 - 100.0 fL Final   01/17/2025 11:24 AM 83 80 - 100 fL Final   12/11/2024 10:42 AM 88 80 - 100 fL Final     MCH   Date/Time Value Ref Range Status   03/17/2025 09:03 AM 23.8 (L) 26.0 - 34.0 pg Final   03/12/2025 10:52 AM 24.1 (L) 27.0 - 33.0 pg Final   01/17/2025 11:24 AM 25.7 (L) 26.0 - 34.0 pg Final   12/11/2024 10:42 AM 26.8 26.0  - 34.0 pg Final     MCHC   Date/Time Value Ref Range Status   03/17/2025 09:03 AM 30.8 (L) 32.0 - 36.0 g/dL Final   03/12/2025 10:52 AM 30.4 (L) 32.0 - 36.0 g/dL Final     Comment:     For adults, a slight decrease in the calculated MCHC  value (in the range of 30 to 32 g/dL) is most likely  not clinically significant; however, it should be  interpreted with caution in correlation with other  red cell parameters and the patient's clinical  condition.     01/17/2025 11:24 AM 30.8 (L) 32.0 - 36.0 g/dL Final   12/11/2024 10:42 AM 30.4 (L) 32.0 - 36.0 g/dL Final     RDW   Date/Time Value Ref Range Status   03/17/2025 09:03 AM 15.8 (H) 11.5 - 14.5 % Final   03/12/2025 10:52 AM 15.4 (H) 11.0 - 15.0 % Final   01/17/2025 11:24 AM 14.0 11.5 - 14.5 % Final   12/11/2024 10:42 AM 13.6 11.5 - 14.5 % Final     Platelets   Date/Time Value Ref Range Status   03/17/2025 09:03  150 - 450 x10*3/uL Final   01/17/2025 11:24  150 - 450 x10*3/uL Final   12/11/2024 10:42  150 - 450 x10*3/uL Final     PLATELET COUNT   Date/Time Value Ref Range Status   03/12/2025 10:52  (H) 140 - 400 Thousand/uL Final     MPV   Date/Time Value Ref Range Status   03/12/2025 10:52 AM 10.7 7.5 - 12.5 fL Final     Neutrophils %   Date/Time Value Ref Range Status   03/17/2025 09:03 AM 58.8 40.0 - 80.0 % Final   01/17/2025 11:24 AM 56.6 40.0 - 80.0 % Final   11/25/2024 11:51 AM 77.5 40.0 - 80.0 % Final     Immature Granulocytes %, Automated   Date/Time Value Ref Range Status   03/17/2025 09:03 AM 0.1 0.0 - 0.9 % Final     Comment:     Immature Granulocyte Count (IG) includes promyelocytes, myelocytes and metamyelocytes but does not include bands. Percent differential counts (%) should be interpreted in the context of the absolute cell counts (cells/UL).   01/17/2025 11:24 AM 0.3 0.0 - 0.9 % Final     Comment:     Immature Granulocyte Count (IG) includes promyelocytes, myelocytes and metamyelocytes but does not include bands. Percent  differential counts (%) should be interpreted in the context of the absolute cell counts (cells/UL).   11/25/2024 11:51 AM 0.3 0.0 - 0.9 % Final     Comment:     Immature Granulocyte Count (IG) includes promyelocytes, myelocytes and metamyelocytes but does not include bands. Percent differential counts (%) should be interpreted in the context of the absolute cell counts (cells/UL).     Lymphocytes %   Date/Time Value Ref Range Status   03/17/2025 09:03 AM 26.0 13.0 - 44.0 % Final   01/17/2025 11:24 AM 28.6 13.0 - 44.0 % Final   11/25/2024 11:51 AM 13.6 13.0 - 44.0 % Final     LYMPHOCYTES   Date/Time Value Ref Range Status   03/12/2025 10:52 AM 30.2 % Final     Monocytes %   Date/Time Value Ref Range Status   03/17/2025 09:03 AM 11.2 2.0 - 10.0 % Final   01/17/2025 11:24 AM 10.1 2.0 - 10.0 % Final   11/25/2024 11:51 AM 7.5 2.0 - 10.0 % Final     MONOCYTES   Date/Time Value Ref Range Status   03/12/2025 10:52 AM 8.5 % Final     Eosinophils %   Date/Time Value Ref Range Status   03/17/2025 09:03 AM 2.9 0.0 - 6.0 % Final   01/17/2025 11:24 AM 3.1 0.0 - 6.0 % Final   11/25/2024 11:51 AM 0.3 0.0 - 6.0 % Final     EOSINOPHILS   Date/Time Value Ref Range Status   03/12/2025 10:52 AM 2.3 % Final     Basophils %   Date/Time Value Ref Range Status   03/17/2025 09:03 AM 1.0 0.0 - 2.0 % Final   01/17/2025 11:24 AM 1.3 0.0 - 2.0 % Final   11/25/2024 11:51 AM 0.8 0.0 - 2.0 % Final     BASOPHILS   Date/Time Value Ref Range Status   03/12/2025 10:52 AM 0.9 % Final     Neutrophils Absolute   Date/Time Value Ref Range Status   03/17/2025 09:03 AM 4.96 1.60 - 5.50 x10*3/uL Final     Comment:     Percent differential counts (%) should be interpreted in the context of the absolute cell counts (cells/uL).   01/17/2025 11:24 AM 3.97 1.60 - 5.50 x10*3/uL Final     Comment:     Percent differential counts (%) should be interpreted in the context of the absolute cell counts (cells/uL).   11/25/2024 11:51 AM 7.38 (H) 1.60 - 5.50 x10*3/uL Final      Comment:     Percent differential counts (%) should be interpreted in the context of the absolute cell counts (cells/uL).     Immature Granulocytes Absolute, Automated   Date/Time Value Ref Range Status   03/17/2025 09:03 AM 0.01 0.00 - 0.50 x10*3/uL Final   01/17/2025 11:24 AM 0.02 0.00 - 0.50 x10*3/uL Final   11/25/2024 11:51 AM 0.03 0.00 - 0.50 x10*3/uL Final     Lymphocytes Absolute   Date/Time Value Ref Range Status   03/17/2025 09:03 AM 2.19 0.80 - 3.00 x10*3/uL Final   01/17/2025 11:24 AM 2.01 0.80 - 3.00 x10*3/uL Final   11/25/2024 11:51 AM 1.30 0.80 - 3.00 x10*3/uL Final     Monocytes Absolute   Date/Time Value Ref Range Status   03/17/2025 09:03 AM 0.94 (H) 0.05 - 0.80 x10*3/uL Final   01/17/2025 11:24 AM 0.71 0.05 - 0.80 x10*3/uL Final   11/25/2024 11:51 AM 0.71 0.05 - 0.80 x10*3/uL Final     Eosinophils Absolute   Date/Time Value Ref Range Status   03/17/2025 09:03 AM 0.24 0.00 - 0.40 x10*3/uL Final   01/17/2025 11:24 AM 0.22 0.00 - 0.40 x10*3/uL Final   11/25/2024 11:51 AM 0.03 0.00 - 0.40 x10*3/uL Final     ABSOLUTE EOSINOPHILS   Date/Time Value Ref Range Status   03/12/2025 10:52  15 - 500 cells/uL Final     Basophils Absolute   Date/Time Value Ref Range Status   03/17/2025 09:03 AM 0.08 0.00 - 0.10 x10*3/uL Final   01/17/2025 11:24 AM 0.09 0.00 - 0.10 x10*3/uL Final   11/25/2024 11:51 AM 0.08 0.00 - 0.10 x10*3/uL Final     ABSOLUTE BASOPHILS   Date/Time Value Ref Range Status   03/12/2025 10:52 AM 77 0 - 200 cells/uL Final       Assessment/Plan      80 year old  female who presents with secondary polycythemia, likely secondary:   RX Phlebotomy: Q 3 monthly    4/7/2025: Last phlebotomy 12 / 2024. RTC 12 months: Phlebotomy Q4 monthly.     Diagnoses and all orders for this visit:  Polycythemia, secondary  -     Clinic Appointment Request Follow Up (review labs, possible phleb)  -     Clinic Appointment Request Follow Up (review lab trends); Future  -     CBC and Auto Differential;  Standing  -     Infusion Appointment Request; Future  -     Infusion Appointment Request; Future  -     Infusion Appointment Request; Future  Other orders  -     sodium chloride 0.9 % bolus 500 mL  -     sodium chloride 0.9 % bolus 500 mL  -     dextrose 5 % in water (D5W) bolus 500 mL  -     diphenhydrAMINE (BENADryl) injection 50 mg  -     methylPREDNISolone sod succinate (SOLU-Medrol) 40 mg/mL injection 40 mg  -     famotidine PF (Pepcid) injection 20 mg  -     EPINEPHrine (Epipen) injection syringe 0.3 mg  -     albuterol 2.5 mg /3 mL (0.083 %) nebulizer solution 3 mL           Nan Mendenhall MD

## 2025-04-07 NOTE — PATIENT INSTRUCTIONS
Today you met with your hematologist/oncologist.  Recent labs were discussed and questions answered.  Scheduling orders were placed.  While we appreciate that you verbalized understanding, if any questions arise after leaving, please do not hesitate to call the office to discuss.  [phone] Tamela Hoffman.     Dr Mendenhall would like you to have labs drawn every 4 months with a possible phlebotomy. That has been scheduled for you. Dr Mendenhall will see you in one year.

## 2025-04-30 DIAGNOSIS — I10 BENIGN HYPERTENSION: ICD-10-CM

## 2025-04-30 RX ORDER — AMLODIPINE BESYLATE 10 MG/1
TABLET ORAL
Qty: 90 TABLET | Refills: 0 | Status: SHIPPED | OUTPATIENT
Start: 2025-04-30

## 2025-05-17 DIAGNOSIS — E03.9 ADULT HYPOTHYROIDISM: ICD-10-CM

## 2025-05-19 RX ORDER — LEVOTHYROXINE SODIUM 100 UG/1
TABLET ORAL
Qty: 90 TABLET | Refills: 0 | Status: SHIPPED | OUTPATIENT
Start: 2025-05-19

## 2025-06-05 ENCOUNTER — APPOINTMENT (OUTPATIENT)
Dept: PRIMARY CARE | Facility: CLINIC | Age: 85
End: 2025-06-05
Payer: MEDICARE

## 2025-06-05 VITALS
HEART RATE: 113 BPM | SYSTOLIC BLOOD PRESSURE: 128 MMHG | HEIGHT: 65 IN | WEIGHT: 178 LBS | OXYGEN SATURATION: 96 % | BODY MASS INDEX: 29.66 KG/M2 | DIASTOLIC BLOOD PRESSURE: 76 MMHG

## 2025-06-05 DIAGNOSIS — N18.31 STAGE 3A CHRONIC KIDNEY DISEASE (MULTI): ICD-10-CM

## 2025-06-05 DIAGNOSIS — Z78.0 POSTMENOPAUSAL ESTROGEN DEFICIENCY: ICD-10-CM

## 2025-06-05 DIAGNOSIS — E03.9 ADULT HYPOTHYROIDISM: ICD-10-CM

## 2025-06-05 DIAGNOSIS — Z23 IMMUNIZATION DUE: ICD-10-CM

## 2025-06-05 DIAGNOSIS — F41.9 ANXIETY: ICD-10-CM

## 2025-06-05 DIAGNOSIS — Z00.00 ROUTINE GENERAL MEDICAL EXAMINATION AT HEALTH CARE FACILITY: Primary | ICD-10-CM

## 2025-06-05 DIAGNOSIS — I10 BENIGN HYPERTENSION: ICD-10-CM

## 2025-06-05 PROCEDURE — 90677 PCV20 VACCINE IM: CPT | Performed by: FAMILY MEDICINE

## 2025-06-05 PROCEDURE — 1160F RVW MEDS BY RX/DR IN RCRD: CPT | Performed by: FAMILY MEDICINE

## 2025-06-05 PROCEDURE — 1170F FXNL STATUS ASSESSED: CPT | Performed by: FAMILY MEDICINE

## 2025-06-05 PROCEDURE — 99214 OFFICE O/P EST MOD 30 MIN: CPT | Performed by: FAMILY MEDICINE

## 2025-06-05 PROCEDURE — 1159F MED LIST DOCD IN RCRD: CPT | Performed by: FAMILY MEDICINE

## 2025-06-05 PROCEDURE — 3078F DIAST BP <80 MM HG: CPT | Performed by: FAMILY MEDICINE

## 2025-06-05 PROCEDURE — G0009 ADMIN PNEUMOCOCCAL VACCINE: HCPCS | Performed by: FAMILY MEDICINE

## 2025-06-05 PROCEDURE — G0439 PPPS, SUBSEQ VISIT: HCPCS | Performed by: FAMILY MEDICINE

## 2025-06-05 PROCEDURE — 3074F SYST BP LT 130 MM HG: CPT | Performed by: FAMILY MEDICINE

## 2025-06-05 RX ORDER — ESCITALOPRAM OXALATE 10 MG/1
TABLET ORAL
Qty: 30 TABLET | Refills: 5 | Status: SHIPPED | OUTPATIENT
Start: 2025-06-05

## 2025-06-05 ASSESSMENT — ACTIVITIES OF DAILY LIVING (ADL)
DOING_HOUSEWORK: INDEPENDENT
DRESSING: INDEPENDENT
TAKING_MEDICATION: INDEPENDENT
GROCERY_SHOPPING: INDEPENDENT
MANAGING_FINANCES: INDEPENDENT
BATHING: INDEPENDENT

## 2025-06-05 NOTE — ASSESSMENT & PLAN NOTE
Orders:    escitalopram (Lexapro) 10 mg tablet; Take 1/2 daily for 14 days - then can increase to one a day      Most of visit discussing this issue and her past problems -   Agreed to try some esCitalopram and discussed gradual increase of dosage    Urged to follow-up in a few weeks
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
## 2025-06-05 NOTE — PATIENT INSTRUCTIONS
"Lets try escitalopram 10 mg  - take 1/2 daily for 14 days -   Take with food in the AM.     IF you think it makes you tired - you can take in the PM after dinner.        Prevnar 20 today       Call [phone] to set up DEXA          ABOUT MEDICARE PREVENTATIVE APPOINTMENTS:     YOUR YEARLY MEDICARE WELLNESS VISIT IS VERY IMPORTANT.      Medicare wants all of their patients to schedule their \"Welcome to Medicare\" visit  when you are in the first 12 months of being on Medicare.    Then every year after that, they want you to schedule your  \"Annual Wellness\"  visit.     These visits have a very specific list of topics I have to cover at the visit,  so you will have paperwork you need to complete before I see you.   Of interest,  there is NOT actually a physical exam as part of this visit.       If you are female,   a Well Woman Exam  (Breast exam and pelvic exam) - are paid for by Medicare every 2 years.   Mammograms are paid for every year.    If you are due for this exam too,  the Medicare wellness and the well woman exam can be done on the same day,  PLEASE TELL THIS TO  WHEN MAKING THE APPOINTMENT.      Some of the Medicare plans ALSO cover a traditional \"physical\" - which has more of the physical exam component.   You may want to find out if your insurance covers that too.       (this is  the code - 79256)  - That can be added to the visit as well.    You would need to tell us.     IT'S VERY HELPFUL IF YOU KNOW WHAT YOUR PLAN COVERS AHEAD OF THE VISIT.      Please check to see what your plan(s) cover.   (Even checking on testing and vaccines that are covered or not helps us greatly!)     At these appointments ,  IF  we cover any of your chronic medical conditions,  medical concerns,  or medications,  I will also be billing charo  \"E/M  code\" which stands for \"Evaluation and Management\"    -  which is a regular office visit code.    THAT CHARGE MAY BE SUBJECT TO CO-PAYS AND DEDUCTIBLES.     PLEASE DO " "NOT \"SAVE\" ALL OF YOUR CONCERNS TO GO OVER AT THESE YEARLY WELLNESS VISITS.   YOU SHOULD BE SCHEDULING SEPARATE APPOINTMENTS WHEN YOU HAVE HEALTH CONCERNS TO DISCUSS AND FOR YOUR MEDICATION CHECK UP APPOINTMENTS.        Thank you.          WELL VISIT/PREVENTATIVE VISIT INSTRUCTIONS:        Please work on healthy nutrition,  optimal sleep, and regular exercise to feel better.    If you smoke - please quit, and if you drink alcohol, please limit your intake.       Vaccines I recommend:   A flu shot every fall      (our office or the pharmacy )  A COVID booster every fall   (pharmacy)     The RSV vaccine - for people 60 - 74  who have chronic conditions, or everyone over 75   (the pharmacy )       If you want to get any shots here in the office - you either get them at a visit or make a nurse appointment for them.      Generally speaking,  good nutrition consists of lean meats, like chicken, fish and turkey (not fried);    plenty of fruits and vegetables (the fresher the better);   Less sugars, saturated fats, and processed foods - especially those made with white flour.   Try to eat the majority of your grain foods  as whole grains.   Keep an eye on your total calories in a day and serving sizes on packaging - this will help you limit your overall intake.    Remember, to lose weight (if you need to), your total calorie intake has to be about 300 - 500 calories less than what you burn in a day.   That number depends on your age, gender and body size.   Some people find a fitbit (calorie tracking device) helpful.      Please be sure to see the dentist every 6 months.    Please see the eye doctor no longer than every 2 years.    When you have your Living Will and Medical Power of  papers completed   (they have to be witnessed by 2 non-relatives or notarized to be legally binding),     please keep your originals in a safe place in your home, but make sure all your physicians have copies and that you take copies " "with you when you go to the hospital.        GETTING TEST RESULTS:    YOU WILL ALWAYS FIND OUT ABOUT ALL YOUR TEST RESULTS FROM ME.  I do not use the \"no news is good news\" policy.   The only time you would not, is if I have told you to get the testing done, and make a follow up appointment to go over it.    Then I will be waiting to talk to you at the visit about your test results.     I have a few different ways I get test results to you  (if its something urgent, we call you)  :       If you have a [employer] card -  I will have your test results on your secureach card within a week.  You should get a phone call telling you when the results are on the card, or just call the card in a week.   If two weeks go  by and you have not heard anything, call the card to see if the results are there,  and if not,  leave me a message.  If you are having trouble using [employer], they have a support line you should call :   [phone];   If you have lost your card, I need to know.     IT'S VERY IMPORTANT THAT YOU CALL TO LISTEN TO YOUR RESULTS, AS IT THEN LETS ME KNOW YOU GOT YOUR RESULTS.        If you get your test results on MY CHART,  you may commonly see your results even before I do.      I will always annotate a message on your results so you know that I saw them and how I feel about them.     If you need some help with MY CHART call the support number at [phone].    IF I mail your results to you,   I need to hear from you if you do not receive them within 2 weeks.      Be sure to let me know your preference for getting results.         BILLING FOR PREVENTATIVE VISITS.     Most insurance companies pay for a yearly \"Wellness Check\"  or they may call it a \"Preventative Physical\"   - but it's important to know what your plan covers ahead of the visit.    It's also a good idea to find out what sort of preventative testing they will cover for screening tests - like cholesterol, blood sugar, or colonoscopies. " Also, find out which vaccines are covered and if you have any responsibility in paying for them.       If at your Wellness Visit,  we cover anything to do with problems/issues  you are having or  chronic medications/ medical conditions you have,   there will ALSO be a regular office charge that day.     Blood work  or testing obtained that has anything to do with an acute issue or chronic condition is billed under that diagnosis and not screening.       It's a good idea to find out from your insurance what is covered and what is your responsibility for all of the above possible charges.           Most importantly,   if you ever have any questions or concerns that cannot wait until your next visit with me,  you can message me through MY CHART or call the office and leave a voice mail message  (please allow for at least 24 hours for responses for these messages).       Take good care.   Dr Ayala

## 2025-06-09 PROBLEM — N18.30 CHRONIC KIDNEY DISEASE, STAGE 3 (MULTI): Status: RESOLVED | Noted: 2023-04-06 | Resolved: 2025-06-09

## 2025-06-19 ENCOUNTER — HOSPITAL ENCOUNTER (OUTPATIENT)
Dept: RADIOLOGY | Facility: HOSPITAL | Age: 85
Discharge: HOME | End: 2025-06-19
Payer: MEDICARE

## 2025-06-19 DIAGNOSIS — Z78.0 POSTMENOPAUSAL ESTROGEN DEFICIENCY: ICD-10-CM

## 2025-06-19 PROCEDURE — 77080 DXA BONE DENSITY AXIAL: CPT | Performed by: RADIOLOGY

## 2025-06-19 PROCEDURE — 77080 DXA BONE DENSITY AXIAL: CPT

## 2025-07-21 ENCOUNTER — APPOINTMENT (OUTPATIENT)
Dept: PRIMARY CARE | Facility: CLINIC | Age: 85
End: 2025-07-21
Payer: MEDICARE

## 2025-07-21 VITALS
HEART RATE: 89 BPM | HEIGHT: 65 IN | WEIGHT: 174 LBS | BODY MASS INDEX: 28.99 KG/M2 | SYSTOLIC BLOOD PRESSURE: 134 MMHG | DIASTOLIC BLOOD PRESSURE: 82 MMHG | OXYGEN SATURATION: 96 %

## 2025-07-21 DIAGNOSIS — I10 BENIGN HYPERTENSION: Primary | ICD-10-CM

## 2025-07-21 DIAGNOSIS — B35.6 TINEA CRURIS: ICD-10-CM

## 2025-07-21 DIAGNOSIS — F41.9 ANXIETY: ICD-10-CM

## 2025-07-21 PROCEDURE — 3079F DIAST BP 80-89 MM HG: CPT | Performed by: FAMILY MEDICINE

## 2025-07-21 PROCEDURE — 1160F RVW MEDS BY RX/DR IN RCRD: CPT | Performed by: FAMILY MEDICINE

## 2025-07-21 PROCEDURE — 99213 OFFICE O/P EST LOW 20 MIN: CPT | Performed by: FAMILY MEDICINE

## 2025-07-21 PROCEDURE — 1159F MED LIST DOCD IN RCRD: CPT | Performed by: FAMILY MEDICINE

## 2025-07-21 PROCEDURE — G2211 COMPLEX E/M VISIT ADD ON: HCPCS | Performed by: FAMILY MEDICINE

## 2025-07-21 PROCEDURE — 3075F SYST BP GE 130 - 139MM HG: CPT | Performed by: FAMILY MEDICINE

## 2025-07-21 RX ORDER — LOSARTAN POTASSIUM 100 MG/1
100 TABLET ORAL DAILY
Qty: 30 TABLET | Refills: 2 | Status: SHIPPED | OUTPATIENT
Start: 2025-07-21 | End: 2025-10-19

## 2025-07-21 RX ORDER — KETOCONAZOLE 20 MG/G
CREAM TOPICAL 2 TIMES DAILY PRN
Qty: 30 G | Refills: 0 | Status: SHIPPED | OUTPATIENT
Start: 2025-07-21 | End: 2026-07-21

## 2025-07-21 RX ORDER — HYDROXYZINE HYDROCHLORIDE 10 MG/1
10 TABLET, FILM COATED ORAL EVERY 6 HOURS PRN
Qty: 30 TABLET | Refills: 0 | Status: SHIPPED | OUTPATIENT
Start: 2025-07-21 | End: 2025-08-20

## 2025-07-21 RX ORDER — LOSARTAN POTASSIUM 100 MG/1
100 TABLET ORAL DAILY
Qty: 30 TABLET | Refills: 2 | Status: SHIPPED | OUTPATIENT
Start: 2025-07-21 | End: 2025-07-21 | Stop reason: SDUPTHER

## 2025-07-21 NOTE — PROGRESS NOTES
"Subjective   Patient ID: Beronica Jeffers is a 84 y.o. female who presents for Follow-up and Rash (Over year heat rash ).    HPI     Patient's first visit with me was June 5, 2025.  At that visit, anxiety was exceptionally high and I added escitalopram daily.  She did bring out and a very old prescription for hydroxyzine 10 mg tablets.  Stated they have helped her for anxiety in the past and she is willing to try that as needed again.    As of today, she did not take the escitalopram.  As she read the side effects, it made her more nervous.    Today she is primarily concerned about the fatigue she has been getting.  She thinks it might be related to her blood pressure medication.    She states that in the past she was on losartan and felt better when she was on that.  However she is uncertain of the dosage.  Currently she is on amlodipine 10 mg daily.  She feels very tired often and lightheaded when she goes from sitting to standing.  She does not check her blood pressures at home.  Blood pressure in the office today is 134/82 -   When she does have a very high level of anxiety.       She also has a rash in her groin mostly on the left side.  Just dry and flaky skin.  She states he gets very itchy    Review of Systems    Objective   /82   Pulse 89   Ht 1.651 m (5' 5\")   Wt 78.9 kg (174 lb)   SpO2 96%   BMI 28.96 kg/m²     Physical Exam  Vitals reviewed.   Constitutional:       General: She is not in acute distress.     Appearance: Normal appearance. She is obese.   HENT:      Head: Normocephalic and atraumatic.      Nose: Nose normal.      Mouth/Throat:      Mouth: Mucous membranes are moist.      Pharynx: No posterior oropharyngeal erythema.     Eyes:      Extraocular Movements: Extraocular movements intact.      Conjunctiva/sclera: Conjunctivae normal.      Pupils: Pupils are equal, round, and reactive to light.       Cardiovascular:      Rate and Rhythm: Normal rate and regular rhythm.      Heart " sounds: Normal heart sounds. No murmur heard.  Pulmonary:      Effort: Pulmonary effort is normal. No respiratory distress.      Breath sounds: Normal breath sounds. No wheezing.     Musculoskeletal:      Cervical back: No rigidity.   Lymphadenopathy:      Cervical: No cervical adenopathy.     Skin:     General: Skin is warm and dry.      Findings: Rash (Patch of dry and flaky skin in the left groin.  Minimal erythema) present.     Neurological:      General: No focal deficit present.      Mental Status: She is alert. Mental status is at baseline.     Psychiatric:         Thought Content: Thought content normal.      Comments: Very fast-paced speech, seems very nervous         Assessment/Plan   Assessment & Plan  Benign hypertension    Orders:    losartan (Cozaar) 100 mg tablet; Take 1 tablet (100 mg) by mouth once daily.    I am wondering if she actually has lower blood pressures at home.  And perhaps is getting hypotensive.  Urged her to get a home blood pressure cuff and start checking those numbers.  I agreed to switch her back to the losartan.  I gave her the 100 mg but she will have to check what dose she was on previously.  Discussed with her that she can cut it in half if necessary  Anxiety    Orders:    hydrOXYzine HCL (Atarax) 10 mg tablet; Take 1 tablet (10 mg) by mouth every 6 hours if needed for anxiety (and sleep). Take one as needed for anxiety up to every 6 hours ; and can take 2 before bed if needed      I agreed to refill the hydroxyzine for her.      Tinea cruris    Orders:    ketoconazole (NIZOral) 2 % cream; Apply topically 2 times a day as needed for itching, irritation or rash.        I asked her to drop off a log of the blood pressures in a few weeks.  And to follow-up in the office in 3 months.      We discussed at visit any disease processes that were of concern as well as the risks, benefits and instructions of any new medication provided.    See orders and discussion section for  information provided to patient in their After Visit Summary.   Patient (and/or caretaker of patient if present)  stated all questions were answered, and they voiced understanding of instructions.

## 2025-07-21 NOTE — PATIENT INSTRUCTIONS
I gave you some hydroxyzine to take as needed for sleep or anxiety.     Lets change the amlodipine to Losartan 100 mg daily -   If the one at home is 50 mg -  take only 1/2 of the 100.     I need you to start checking your BP at home.     Drop off or mail a log for the BP in a few weeks.       I can see you back in 3 months      I sent in some ketoconazole cream -  twice a day for the rash

## 2025-07-22 NOTE — ASSESSMENT & PLAN NOTE
Orders:    hydrOXYzine HCL (Atarax) 10 mg tablet; Take 1 tablet (10 mg) by mouth every 6 hours if needed for anxiety (and sleep). Take one as needed for anxiety up to every 6 hours ; and can take 2 before bed if needed      I agreed to refill the hydroxyzine for her.

## 2025-07-22 NOTE — ASSESSMENT & PLAN NOTE
Orders:    losartan (Cozaar) 100 mg tablet; Take 1 tablet (100 mg) by mouth once daily.    I am wondering if she actually has lower blood pressures at home.  And perhaps is getting hypotensive.  Urged her to get a home blood pressure cuff and start checking those numbers.  I agreed to switch her back to the losartan.  I gave her the 100 mg but she will have to check what dose she was on previously.  Discussed with her that she can cut it in half if necessary

## 2025-07-31 ENCOUNTER — APPOINTMENT (OUTPATIENT)
Dept: PRIMARY CARE | Facility: CLINIC | Age: 85
End: 2025-07-31
Payer: MEDICARE

## 2025-07-31 DIAGNOSIS — I10 BENIGN HYPERTENSION: ICD-10-CM

## 2025-07-31 PROCEDURE — 99211 OFF/OP EST MAY X REQ PHY/QHP: CPT | Performed by: FAMILY MEDICINE

## 2025-08-03 ENCOUNTER — TELEPHONE (OUTPATIENT)
Dept: PRIMARY CARE | Facility: CLINIC | Age: 85
End: 2025-08-03
Payer: MEDICARE

## 2025-08-04 NOTE — TELEPHONE ENCOUNTER
Please call pt -     Came in the other day to check her BP cuff at home.     Her cuff is good.     So now I have to find out what her BPs are running at home once she has been on the Losartan a few weeks.     She can mail in or drop off please.

## 2025-08-04 NOTE — TELEPHONE ENCOUNTER
Informed pt she said she is doing so much better on this medication she's not out of breathe like she was before.

## 2025-08-07 ENCOUNTER — INFUSION (OUTPATIENT)
Dept: HEMATOLOGY/ONCOLOGY | Facility: CLINIC | Age: 85
End: 2025-08-07
Payer: MEDICARE

## 2025-08-07 VITALS
BODY MASS INDEX: 29.31 KG/M2 | RESPIRATION RATE: 17 BRPM | HEART RATE: 98 BPM | TEMPERATURE: 97.5 F | SYSTOLIC BLOOD PRESSURE: 143 MMHG | WEIGHT: 176.15 LBS | DIASTOLIC BLOOD PRESSURE: 79 MMHG | OXYGEN SATURATION: 94 %

## 2025-08-07 DIAGNOSIS — D75.1 POLYCYTHEMIA, SECONDARY: ICD-10-CM

## 2025-08-07 LAB
ANION GAP SERPL CALC-SCNC: 11 MMOL/L (ref 10–20)
BASOPHILS # BLD AUTO: 0.06 X10*3/UL (ref 0–0.1)
BASOPHILS NFR BLD AUTO: 0.8 %
BUN SERPL-MCNC: 18 MG/DL (ref 6–23)
CALCIUM SERPL-MCNC: 8.7 MG/DL (ref 8.6–10.3)
CHLORIDE SERPL-SCNC: 103 MMOL/L (ref 98–107)
CO2 SERPL-SCNC: 28 MMOL/L (ref 21–32)
CREAT SERPL-MCNC: 1.04 MG/DL (ref 0.5–1.05)
EGFRCR SERPLBLD CKD-EPI 2021: 54 ML/MIN/1.73M*2
EOSINOPHIL # BLD AUTO: 0.19 X10*3/UL (ref 0–0.4)
EOSINOPHIL NFR BLD AUTO: 2.7 %
ERYTHROCYTE [DISTWIDTH] IN BLOOD BY AUTOMATED COUNT: 18.1 % (ref 11.5–14.5)
FERRITIN SERPL-MCNC: 21 NG/ML (ref 8–150)
GLUCOSE SERPL-MCNC: 92 MG/DL (ref 74–99)
HCT VFR BLD AUTO: 40.7 % (ref 36–46)
HGB BLD-MCNC: 12.6 G/DL (ref 12–16)
IMM GRANULOCYTES # BLD AUTO: 0 X10*3/UL (ref 0–0.5)
IMM GRANULOCYTES NFR BLD AUTO: 0 % (ref 0–0.9)
IRON SATN MFR SERPL: 7 % (ref 25–45)
IRON SERPL-MCNC: 27 UG/DL (ref 35–150)
LYMPHOCYTES # BLD AUTO: 2.04 X10*3/UL (ref 0.8–3)
LYMPHOCYTES NFR BLD AUTO: 28.6 %
MCH RBC QN AUTO: 23.3 PG (ref 26–34)
MCHC RBC AUTO-ENTMCNC: 31 G/DL (ref 32–36)
MCV RBC AUTO: 75 FL (ref 80–100)
MONOCYTES # BLD AUTO: 0.74 X10*3/UL (ref 0.05–0.8)
MONOCYTES NFR BLD AUTO: 10.4 %
NEUTROPHILS # BLD AUTO: 4.11 X10*3/UL (ref 1.6–5.5)
NEUTROPHILS NFR BLD AUTO: 57.5 %
NRBC BLD-RTO: ABNORMAL /100{WBCS}
PLATELET # BLD AUTO: 325 X10*3/UL (ref 150–450)
POTASSIUM SERPL-SCNC: 4.3 MMOL/L (ref 3.5–5.3)
RBC # BLD AUTO: 5.4 X10*6/UL (ref 4–5.2)
SODIUM SERPL-SCNC: 138 MMOL/L (ref 136–145)
TIBC SERPL-MCNC: 369 UG/DL (ref 240–445)
UIBC SERPL-MCNC: 342 UG/DL (ref 110–370)
WBC # BLD AUTO: 7.1 X10*3/UL (ref 4.4–11.3)

## 2025-08-07 PROCEDURE — 82728 ASSAY OF FERRITIN: CPT

## 2025-08-07 PROCEDURE — 36415 COLL VENOUS BLD VENIPUNCTURE: CPT

## 2025-08-07 PROCEDURE — 83540 ASSAY OF IRON: CPT

## 2025-08-07 PROCEDURE — 80048 BASIC METABOLIC PNL TOTAL CA: CPT

## 2025-08-07 PROCEDURE — 85025 COMPLETE CBC W/AUTO DIFF WBC: CPT

## 2025-08-07 RX ORDER — HEPARIN SODIUM,PORCINE/PF 10 UNIT/ML
50 SYRINGE (ML) INTRAVENOUS AS NEEDED
OUTPATIENT
Start: 2025-08-07

## 2025-08-07 RX ORDER — HEPARIN 100 UNIT/ML
500 SYRINGE INTRAVENOUS AS NEEDED
OUTPATIENT
Start: 2025-08-07

## 2025-08-07 ASSESSMENT — PAIN SCALES - GENERAL: PAINLEVEL_OUTOF10: 0-NO PAIN

## 2025-08-07 NOTE — PROGRESS NOTES
Education Documentation  Returning to Work/School/Activities, taught by Arabella Garcia RN at 8/7/2025 11:45 AM.  Learner: Patient  Readiness: Acceptance  Method: Explanation  Response: Verbalizes Understanding    Stress Management, taught by Arabella Garcia RN at 8/7/2025 11:45 AM.  Learner: Patient  Readiness: Acceptance  Method: Explanation  Response: Verbalizes Understanding    Changing Role with Self and Family, taught by Arabella Garcia RN at 8/7/2025 11:45 AM.  Learner: Patient  Readiness: Acceptance  Method: Explanation  Response: Verbalizes Understanding    Leisure Education, taught by Arabella Garcia RN at 8/7/2025 11:45 AM.  Learner: Patient  Readiness: Acceptance  Method: Explanation  Response: Verbalizes Understanding    Health Systems & Community Resources, taught by Arabella Garcia RN at 8/7/2025 11:45 AM.  Learner: Patient  Readiness: Acceptance  Method: Explanation  Response: Verbalizes Understanding    Advanced Medical Directives, taught by Arabella Garcia RN at 8/7/2025 11:45 AM.  Learner: Patient  Readiness: Acceptance  Method: Explanation  Response: Verbalizes Understanding    Escort, Parking, Transportation Home, taught by Arabella Garcia RN at 8/7/2025 11:45 AM.  Learner: Patient  Readiness: Acceptance  Method: Explanation  Response: Verbalizes Understanding    Referrals to Support Services, taught by Arabella Garcia RN at 8/7/2025 11:45 AM.  Learner: Patient  Readiness: Acceptance  Method: Explanation  Response: Verbalizes Understanding    Support Systems, taught by Arabella Garcia RN at 8/7/2025 11:45 AM.  Learner: Patient  Readiness: Acceptance  Method: Explanation  Response: Verbalizes Understanding    Financial Assistance Information, taught by Arabella Garcia RN at 8/7/2025 11:45 AM.  Learner: Patient  Readiness: Acceptance  Method: Explanation  Response: Verbalizes Understanding    Financial Benefits Summary, taught by Arabella Garcia RN at 8/7/2025 11:45 AM.  Learner: Patient  Readiness: Acceptance  Method:  Explanation  Response: Verbalizes Understanding    Healthy Lifestyle, taught by Arabella Garcia RN at 8/7/2025 11:45 AM.  Learner: Patient  Readiness: Acceptance  Method: Explanation  Response: Verbalizes Understanding    Monitoring Weight, taught by Arabella Garcia RN at 8/7/2025 11:45 AM.  Learner: Patient  Readiness: Acceptance  Method: Explanation  Response: Verbalizes Understanding    Tips for Daily Living, taught by Arabella Garcia RN at 8/7/2025 11:45 AM.  Learner: Patient  Readiness: Acceptance  Method: Explanation  Response: Verbalizes Understanding    Nutrition/Diet, taught by Arabella Garcia RN at 8/7/2025 11:45 AM.  Learner: Patient  Readiness: Acceptance  Method: Explanation  Response: Verbalizes Understanding    Food-Drug Interactions, taught by Arabella Garcia RN at 8/7/2025 11:45 AM.  Learner: Patient  Readiness: Acceptance  Method: Explanation  Response: Verbalizes Understanding    Nutrition Related Education, taught by Arabella Garcia RN at 8/7/2025 11:45 AM.  Learner: Patient  Readiness: Acceptance  Method: Explanation  Response: Verbalizes Understanding    Pain Medication Actions & Side Effects, taught by Arabella Garcia RN at 8/7/2025 11:45 AM.  Learner: Patient  Readiness: Acceptance  Method: Explanation  Response: Verbalizes Understanding    Patient Controlled Analgesia, taught by Arabella Garcia RN at 8/7/2025 11:45 AM.  Learner: Patient  Readiness: Acceptance  Method: Explanation  Response: Verbalizes Understanding    Discuss the Use of Pain Control Measures Before Pain Becomes Severe, taught by Arabella Garcia RN at 8/7/2025 11:45 AM.  Learner: Patient  Readiness: Acceptance  Method: Explanation  Response: Verbalizes Understanding    Pain Management, taught by Arabella Garcia RN at 8/7/2025 11:45 AM.  Learner: Patient  Readiness: Acceptance  Method: Explanation  Response: Verbalizes Understanding    Pain Rating Scale, taught by Arabella Garcia RN at 8/7/2025 11:45 AM.  Learner: Patient  Readiness: Acceptance  Method:  Explanation  Response: Verbalizes Understanding    Phlebotomy, taught by Arabella aGrcia RN at 8/7/2025 11:45 AM.  Learner: Patient  Readiness: Acceptance  Method: Explanation  Response: Verbalizes Understanding    Treatment Plan and Schedule, taught by Arabella Garcia RN at 8/7/2025 11:45 AM.  Learner: Patient  Readiness: Acceptance  Method: Explanation  Response: Verbalizes Understanding    Complete Blood Count with Differential (CBC w/ Diff), taught by Arabella Garcia RN at 8/7/2025 11:45 AM.  Learner: Patient  Readiness: Acceptance  Method: Explanation  Response: Verbalizes Understanding    Education Comments  No comments found.

## 2025-08-07 NOTE — PROGRESS NOTES
Patient did not meet parameter for phlebotomy with hematocrit of 40.7. Reviewed patient's lab results and schedule with patient and she verbalized understanding. Patient left via ambulatory and in no distress.

## 2025-08-16 DIAGNOSIS — E03.9 ADULT HYPOTHYROIDISM: ICD-10-CM

## 2025-08-18 RX ORDER — LEVOTHYROXINE SODIUM 100 UG/1
TABLET ORAL
Qty: 90 TABLET | Refills: 0 | Status: SHIPPED | OUTPATIENT
Start: 2025-08-18

## 2025-10-27 ENCOUNTER — APPOINTMENT (OUTPATIENT)
Dept: PRIMARY CARE | Facility: CLINIC | Age: 85
End: 2025-10-27
Payer: MEDICARE

## 2026-06-05 ENCOUNTER — APPOINTMENT (OUTPATIENT)
Dept: PRIMARY CARE | Facility: CLINIC | Age: 86
End: 2026-06-05
Payer: MEDICARE